# Patient Record
Sex: FEMALE | Race: ASIAN | NOT HISPANIC OR LATINO | URBAN - METROPOLITAN AREA
[De-identification: names, ages, dates, MRNs, and addresses within clinical notes are randomized per-mention and may not be internally consistent; named-entity substitution may affect disease eponyms.]

---

## 2017-01-09 ENCOUNTER — EMERGENCY (EMERGENCY)
Facility: HOSPITAL | Age: 77
LOS: 1 days | Discharge: HOME | End: 2017-01-09
Attending: EMERGENCY MEDICINE | Admitting: EMERGENCY MEDICINE
Payer: COMMERCIAL

## 2017-01-09 ENCOUNTER — APPOINTMENT (OUTPATIENT)
Dept: UROLOGY | Facility: CLINIC | Age: 77
End: 2017-01-09

## 2017-01-09 VITALS
HEART RATE: 65 BPM | RESPIRATION RATE: 16 BRPM | SYSTOLIC BLOOD PRESSURE: 116 MMHG | DIASTOLIC BLOOD PRESSURE: 65 MMHG | OXYGEN SATURATION: 98 %

## 2017-01-09 VITALS — RESPIRATION RATE: 16 BRPM | HEART RATE: 78 BPM | DIASTOLIC BLOOD PRESSURE: 60 MMHG | SYSTOLIC BLOOD PRESSURE: 108 MMHG
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
## 2017-01-09 DIAGNOSIS — Z87.448 PERSONAL HISTORY OF OTHER DISEASES OF URINARY SYSTEM: Chronic | ICD-10-CM
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
## 2017-01-09 DIAGNOSIS — Z98.890 OTHER SPECIFIED POSTPROCEDURAL STATES: Chronic | ICD-10-CM

## 2017-01-09 DIAGNOSIS — T83.092A OTHER MECHANICAL COMPLICATION OF NEPHROSTOMY CATHETER, INITIAL ENCOUNTER: ICD-10-CM

## 2017-01-09 DIAGNOSIS — Z93.3 COLOSTOMY STATUS: Chronic | ICD-10-CM

## 2017-01-09 DIAGNOSIS — Z88.0 ALLERGY STATUS TO PENICILLIN: ICD-10-CM

## 2017-01-09 PROCEDURE — 50431 NJX PX NFROSGRM &/URTRGRM: CPT

## 2017-01-09 PROCEDURE — C1729: CPT

## 2017-01-09 PROCEDURE — 50431 NJX PX NFROSGRM &/URTRGRM: CPT | Mod: LT

## 2017-01-09 PROCEDURE — C1769: CPT

## 2017-01-09 PROCEDURE — 74018 RADEX ABDOMEN 1 VIEW: CPT

## 2017-01-09 PROCEDURE — 99284 EMERGENCY DEPT VISIT MOD MDM: CPT

## 2017-01-09 PROCEDURE — 74000: CPT | Mod: 26

## 2017-01-09 PROCEDURE — 99284 EMERGENCY DEPT VISIT MOD MDM: CPT | Mod: 25

## 2017-01-09 PROCEDURE — 99285 EMERGENCY DEPT VISIT HI MDM: CPT | Mod: GC

## 2017-01-09 NOTE — ED PROVIDER NOTE - MEDICAL DECISION MAKING DETAILS
75 yo female with stage 4 colon ca presenting with leaking urostomy site; will consult urology for change

## 2017-01-09 NOTE — ED PROVIDER NOTE - ATTENDING CONTRIBUTION TO CARE
75 y/o f wit pmhx stage 4 metastatic colon ca on home hospice with bilateral urostomy bags and colostomy bag presents for leaking from Urostomy site on the left. Daughter noted it and concern of increasing urine. Had urostomy placed approx 1 year ago at [facility].   No bleeding no pain. no fever no chills. patient also has picc line for dilaudid.  nephrostomy tube was changed 3 weeks ago during last admission   Gen. elderly woman, no apparent distress, appears stated age.  HEENT: EOMI, moist mm.  Lungs: b/l air entry   CVS: S1S2   Abd; Soft non tender, non distended   back nephrostomy tube in back, left side lower and displaced inferior that right side. back appears clean dry and intact, no erythema.    Ext: no edema   Neuro Awake alert, appropriate.

## 2017-01-09 NOTE — ED ADULT NURSE REASSESSMENT NOTE - NS ED NURSE REASSESS COMMENT FT1
pt left with daughter via wheelchair prior to signing d/c paperwork, pt was waiting for non-emergent ambulette and daughter did not wish to wait much longer and preferred to take her mother home her self via private car. pt ambulates well without assistance. upon returning to pt with d/c paperwork daughter and pt had both left, pt had no IV in place. rep in gold notified and ambulance for transfer canceled successfully.

## 2017-01-09 NOTE — ED ADULT NURSE NOTE - OBJECTIVE STATEMENT
76 y.o female arrived by ambulance c c/o nephrostomy tubes not draining. Approx. 25 cc or urine present in B/L nephrotomy tubes upon exam/ light yellow urine. Pt has colostomy bag in place. PCA pump- hydromorphone POA infusing into PICC L arm. Daughter on way to hospital as per EMS. pt pointing to L lower back as site of discomfort- no rash noted to the area. Afebrile. No visible chills. Pitting edema to Lower extremities below knee. No active vomiting.

## 2017-01-09 NOTE — ED PROVIDER NOTE - CARE PLAN
Principal Discharge DX:	Nephrostomy complication  Instructions for follow-up, activity and diet:	Follow up with your medical doctor in 2-3 days or call our clinic at [phone] and state you were seen in the Emergency Department and would like to be seen in clinic.   Drink at least 2 Liters or 64 Ounces of water each day.  Return for any persistent, worsening symptoms, or ANY concerns at all. Principal Discharge DX:	Nephrostomy complication  Instructions for follow-up, activity and diet:	Follow up with your medical doctor in 2-3 days or call our clinic at [phone] and state you were seen in the Emergency Department and would like to be seen in clinic.   Drink at least 2 Liters or 64 Ounces of water each day.  Return for any persistent, worsening symptoms, or ANY concerns at all.

## 2017-01-09 NOTE — ED PROVIDER NOTE - PROGRESS NOTE DETAILS
spoke with Mildred Cunha from hospice who states that they are not responsible for changing the nephrostomy tubes, they help clean and change the dressing.  she states that there was increased leakage from the left nephrostomy tube.  no other issues relative to her baseline. cleared by IR, no intervention was needed.  patient was ready for discharge

## 2017-01-09 NOTE — ED ADULT NURSE NOTE - PMH
Colon cancer  metastatic to abdominal lymph nodes, uterus, and possibly lungs  Urinary obstruction  by abdominal mass s/p b/l nephrostomy tubes.

## 2017-01-09 NOTE — ED PROVIDER NOTE - OBJECTIVE STATEMENT
75 yo female with stage 4 metastatic colon ca on home hospice with bilateral urostomy bags and colostomy bag presenting with leaking urostomy site on the left side.  per daughter, patient has been noticing increased urine and wetness of her clothes for the last few days.  urostomy bags were placed 1 year ago at [facility].  patients daughter does not remember the name of the physician who performed the urostomy.  denies pain, fevers, chills, n/v/d.  otherwise patient is her baseline.  has picc line for dilaudid.    PCP- does not have one 77 yo female with stage 4 metastatic colon ca on home hospice with bilateral urostomy bags and colostomy bag presenting with leaking urostomy site on the left side.  per daughter, patient has been noticing increased urine and wetness of her clothes for the last few days.  urostomy bags were placed 1 year ago at [facility].  patients daughter does not remember the name of the physician who performed the urostomy.  denies pain, fevers, chills, n/v/d.  otherwise patient is her baseline.  has picc line for dilaudid.  nephrostomy tube was changed 3 weeks ago during last admission     PCP- does not have one

## 2017-01-09 NOTE — ED PROVIDER NOTE - PLAN OF CARE
Follow up with your medical doctor in 2-3 days or call our clinic at [phone] and state you were seen in the Emergency Department and would like to be seen in clinic.   Drink at least 2 Liters or 64 Ounces of water each day.  Return for any persistent, worsening symptoms, or ANY concerns at all.

## 2017-01-19 ENCOUNTER — APPOINTMENT (OUTPATIENT)
Dept: UROLOGY | Facility: CLINIC | Age: 77
End: 2017-01-19

## 2017-01-19 VITALS
OXYGEN SATURATION: 97 % | SYSTOLIC BLOOD PRESSURE: 117 MMHG | BODY MASS INDEX: 21.62 KG/M2 | HEART RATE: 74 BPM | RESPIRATION RATE: 18 BRPM | DIASTOLIC BLOOD PRESSURE: 72 MMHG | HEIGHT: 63 IN | WEIGHT: 122 LBS

## 2017-01-19 DIAGNOSIS — N13.5 CROSSING VESSEL AND STRICTURE OF URETER W/OUT HYDRONEPHROSIS: ICD-10-CM

## 2017-01-19 DIAGNOSIS — R35.0 FREQUENCY OF MICTURITION: ICD-10-CM

## 2017-01-19 DIAGNOSIS — C18.9 MALIGNANT NEOPLASM OF COLON, UNSPECIFIED: ICD-10-CM

## 2017-01-19 DIAGNOSIS — R30.0 DYSURIA: ICD-10-CM

## 2017-01-19 DIAGNOSIS — Z78.9 OTHER SPECIFIED HEALTH STATUS: ICD-10-CM

## 2017-01-19 RX ORDER — LORATADINE 10 MG
17 TABLET,DISINTEGRATING ORAL
Refills: 0 | Status: ACTIVE | COMMUNITY

## 2017-01-19 RX ORDER — DEXAMETHASONE 6 MG/1
6 TABLET ORAL
Refills: 0 | Status: ACTIVE | COMMUNITY

## 2017-01-19 RX ORDER — DOCUSATE SODIUM 100 MG/1
TABLET ORAL
Refills: 0 | Status: ACTIVE | COMMUNITY

## 2017-01-19 RX ORDER — HYDROMORPHONE HYDROCHLORIDE 2 MG/ML
2 INJECTION INTRAMUSCULAR; INTRAVENOUS; SUBCUTANEOUS
Refills: 0 | Status: ACTIVE | COMMUNITY

## 2017-02-18 ENCOUNTER — INPATIENT (INPATIENT)
Facility: HOSPITAL | Age: 77
LOS: 4 days | Discharge: ANOTHER HOSPITAL | DRG: 389 | End: 2017-02-23
Attending: INTERNAL MEDICINE | Admitting: INTERNAL MEDICINE
Payer: OTHER MISCELLANEOUS

## 2017-02-18 VITALS
HEART RATE: 94 BPM | TEMPERATURE: 101 F | DIASTOLIC BLOOD PRESSURE: 88 MMHG | RESPIRATION RATE: 22 BRPM | SYSTOLIC BLOOD PRESSURE: 134 MMHG | OXYGEN SATURATION: 88 %

## 2017-02-18 DIAGNOSIS — Z93.3 COLOSTOMY STATUS: Chronic | ICD-10-CM

## 2017-02-18 DIAGNOSIS — K56.69 OTHER INTESTINAL OBSTRUCTION: ICD-10-CM

## 2017-02-18 DIAGNOSIS — R50.9 FEVER, UNSPECIFIED: ICD-10-CM

## 2017-02-18 DIAGNOSIS — Z41.8 ENCOUNTER FOR OTHER PROCEDURES FOR PURPOSES OTHER THAN REMEDYING HEALTH STATE: ICD-10-CM

## 2017-02-18 DIAGNOSIS — Z98.890 OTHER SPECIFIED POSTPROCEDURAL STATES: Chronic | ICD-10-CM

## 2017-02-18 DIAGNOSIS — C18.9 MALIGNANT NEOPLASM OF COLON, UNSPECIFIED: ICD-10-CM

## 2017-02-18 DIAGNOSIS — N13.9 OBSTRUCTIVE AND REFLUX UROPATHY, UNSPECIFIED: ICD-10-CM

## 2017-02-18 DIAGNOSIS — Z87.448 PERSONAL HISTORY OF OTHER DISEASES OF URINARY SYSTEM: Chronic | ICD-10-CM

## 2017-02-18 DIAGNOSIS — R60.0 LOCALIZED EDEMA: ICD-10-CM

## 2017-02-18 LAB
ALBUMIN SERPL ELPH-MCNC: 3.2 G/DL — LOW (ref 3.3–5)
ALP SERPL-CCNC: 52 U/L — SIGNIFICANT CHANGE UP (ref 40–120)
ALT FLD-CCNC: 11 U/L RC — SIGNIFICANT CHANGE UP (ref 10–45)
ANION GAP SERPL CALC-SCNC: 13 MMOL/L — SIGNIFICANT CHANGE UP (ref 5–17)
APTT BLD: 28.3 SEC — SIGNIFICANT CHANGE UP (ref 27.5–37.4)
AST SERPL-CCNC: 11 U/L — SIGNIFICANT CHANGE UP (ref 10–40)
BASOPHILS # BLD AUTO: 0 K/UL — SIGNIFICANT CHANGE UP (ref 0–0.2)
BASOPHILS NFR BLD AUTO: 0.1 % — SIGNIFICANT CHANGE UP (ref 0–2)
BILIRUB SERPL-MCNC: 0.5 MG/DL — SIGNIFICANT CHANGE UP (ref 0.2–1.2)
BUN SERPL-MCNC: 33 MG/DL — HIGH (ref 7–23)
CALCIUM SERPL-MCNC: 8.4 MG/DL — SIGNIFICANT CHANGE UP (ref 8.4–10.5)
CHLORIDE SERPL-SCNC: 96 MMOL/L — SIGNIFICANT CHANGE UP (ref 96–108)
CO2 SERPL-SCNC: 25 MMOL/L — SIGNIFICANT CHANGE UP (ref 22–31)
CREAT SERPL-MCNC: 1.06 MG/DL — SIGNIFICANT CHANGE UP (ref 0.5–1.3)
EOSINOPHIL # BLD AUTO: 0 K/UL — SIGNIFICANT CHANGE UP (ref 0–0.5)
EOSINOPHIL NFR BLD AUTO: 0.6 % — SIGNIFICANT CHANGE UP (ref 0–6)
GAS PNL BLDV: SIGNIFICANT CHANGE UP
GLUCOSE SERPL-MCNC: 133 MG/DL — HIGH (ref 70–99)
HCT VFR BLD CALC: 34.5 % — SIGNIFICANT CHANGE UP (ref 34.5–45)
HGB BLD-MCNC: 11.3 G/DL — LOW (ref 11.5–15.5)
INR BLD: 1.02 RATIO — SIGNIFICANT CHANGE UP (ref 0.88–1.16)
LYMPHOCYTES # BLD AUTO: 0.4 K/UL — LOW (ref 1–3.3)
LYMPHOCYTES # BLD AUTO: 7.8 % — LOW (ref 13–44)
MAGNESIUM SERPL-MCNC: 2.6 MG/DL — SIGNIFICANT CHANGE UP (ref 1.6–2.6)
MCHC RBC-ENTMCNC: 29.4 PG — SIGNIFICANT CHANGE UP (ref 27–34)
MCHC RBC-ENTMCNC: 32.6 GM/DL — SIGNIFICANT CHANGE UP (ref 32–36)
MCV RBC AUTO: 90.1 FL — SIGNIFICANT CHANGE UP (ref 80–100)
MONOCYTES # BLD AUTO: 0.4 K/UL — SIGNIFICANT CHANGE UP (ref 0–0.9)
MONOCYTES NFR BLD AUTO: 9.5 % — SIGNIFICANT CHANGE UP (ref 2–14)
NEUTROPHILS # BLD AUTO: 3.9 K/UL — SIGNIFICANT CHANGE UP (ref 1.8–7.4)
NEUTROPHILS NFR BLD AUTO: 82 % — HIGH (ref 43–77)
PHOSPHATE SERPL-MCNC: 4.8 MG/DL — HIGH (ref 2.5–4.5)
PLATELET # BLD AUTO: 379 K/UL — SIGNIFICANT CHANGE UP (ref 150–400)
POTASSIUM SERPL-MCNC: 4.7 MMOL/L — SIGNIFICANT CHANGE UP (ref 3.5–5.3)
POTASSIUM SERPL-SCNC: 4.7 MMOL/L — SIGNIFICANT CHANGE UP (ref 3.5–5.3)
PROT SERPL-MCNC: 6 G/DL — SIGNIFICANT CHANGE UP (ref 6–8.3)
PROTHROM AB SERPL-ACNC: 11.1 SEC — SIGNIFICANT CHANGE UP (ref 10–13.1)
RBC # BLD: 3.83 M/UL — SIGNIFICANT CHANGE UP (ref 3.8–5.2)
RBC # FLD: 16.3 % — HIGH (ref 10.3–14.5)
SODIUM SERPL-SCNC: 134 MMOL/L — LOW (ref 135–145)
WBC # BLD: 4.7 K/UL — SIGNIFICANT CHANGE UP (ref 3.8–10.5)
WBC # FLD AUTO: 4.7 K/UL — SIGNIFICANT CHANGE UP (ref 3.8–10.5)

## 2017-02-18 PROCEDURE — 99223 1ST HOSP IP/OBS HIGH 75: CPT | Mod: GC

## 2017-02-18 PROCEDURE — 71010: CPT | Mod: 26

## 2017-02-18 PROCEDURE — 99285 EMERGENCY DEPT VISIT HI MDM: CPT

## 2017-02-18 PROCEDURE — 74177 CT ABD & PELVIS W/CONTRAST: CPT | Mod: 26

## 2017-02-18 RX ORDER — HYDROMORPHONE HYDROCHLORIDE 2 MG/ML
2 INJECTION INTRAMUSCULAR; INTRAVENOUS; SUBCUTANEOUS ONCE
Qty: 0 | Refills: 0 | Status: DISCONTINUED | OUTPATIENT
Start: 2017-02-18 | End: 2017-02-19

## 2017-02-18 RX ORDER — SODIUM CHLORIDE 9 MG/ML
1000 INJECTION INTRAMUSCULAR; INTRAVENOUS; SUBCUTANEOUS ONCE
Qty: 0 | Refills: 0 | Status: COMPLETED | OUTPATIENT
Start: 2017-02-18 | End: 2017-02-18

## 2017-02-18 RX ORDER — HALOPERIDOL DECANOATE 100 MG/ML
0.5 INJECTION INTRAMUSCULAR EVERY 6 HOURS
Qty: 0 | Refills: 0 | Status: DISCONTINUED | OUTPATIENT
Start: 2017-02-18 | End: 2017-02-23

## 2017-02-18 RX ORDER — SODIUM CHLORIDE 9 MG/ML
1000 INJECTION INTRAMUSCULAR; INTRAVENOUS; SUBCUTANEOUS
Qty: 0 | Refills: 0 | Status: DISCONTINUED | OUTPATIENT
Start: 2017-02-18 | End: 2017-02-23

## 2017-02-18 RX ORDER — DEXAMETHASONE 0.5 MG/5ML
6 ELIXIR ORAL EVERY 8 HOURS
Qty: 0 | Refills: 0 | Status: DISCONTINUED | OUTPATIENT
Start: 2017-02-18 | End: 2017-02-23

## 2017-02-18 RX ORDER — ONDANSETRON 8 MG/1
4 TABLET, FILM COATED ORAL EVERY 8 HOURS
Qty: 0 | Refills: 0 | Status: DISCONTINUED | OUTPATIENT
Start: 2017-02-18 | End: 2017-02-23

## 2017-02-18 RX ORDER — ACETAMINOPHEN 500 MG
1000 TABLET ORAL ONCE
Qty: 0 | Refills: 0 | Status: COMPLETED | OUTPATIENT
Start: 2017-02-18 | End: 2017-02-18

## 2017-02-18 RX ORDER — OCTREOTIDE ACETATE 200 UG/ML
10 INJECTION, SOLUTION INTRAVENOUS; SUBCUTANEOUS
Qty: 500 | Refills: 0 | Status: DISCONTINUED | OUTPATIENT
Start: 2017-02-18 | End: 2017-02-23

## 2017-02-18 RX ORDER — DEXAMETHASONE 0.5 MG/5ML
6 ELIXIR ORAL EVERY 8 HOURS
Qty: 0 | Refills: 0 | Status: DISCONTINUED | OUTPATIENT
Start: 2017-02-18 | End: 2017-02-18

## 2017-02-18 RX ORDER — HYDROMORPHONE HYDROCHLORIDE 2 MG/ML
30 INJECTION INTRAMUSCULAR; INTRAVENOUS; SUBCUTANEOUS
Qty: 0 | Refills: 0 | Status: DISCONTINUED | OUTPATIENT
Start: 2017-02-18 | End: 2017-02-19

## 2017-02-18 RX ORDER — HEPARIN SODIUM 5000 [USP'U]/ML
5000 INJECTION INTRAVENOUS; SUBCUTANEOUS EVERY 8 HOURS
Qty: 0 | Refills: 0 | Status: DISCONTINUED | OUTPATIENT
Start: 2017-02-18 | End: 2017-02-18

## 2017-02-18 RX ADMIN — ONDANSETRON 4 MILLIGRAM(S): 8 TABLET, FILM COATED ORAL at 21:35

## 2017-02-18 RX ADMIN — OCTREOTIDE ACETATE 2 MICROGRAM(S)/HR: 200 INJECTION, SOLUTION INTRAVENOUS; SUBCUTANEOUS at 21:30

## 2017-02-18 RX ADMIN — HYDROMORPHONE HYDROCHLORIDE 30 MILLILITER(S): 2 INJECTION INTRAMUSCULAR; INTRAVENOUS; SUBCUTANEOUS at 22:22

## 2017-02-18 RX ADMIN — SODIUM CHLORIDE 20 MILLILITER(S): 9 INJECTION INTRAMUSCULAR; INTRAVENOUS; SUBCUTANEOUS at 21:30

## 2017-02-18 RX ADMIN — Medication 400 MILLIGRAM(S): at 15:31

## 2017-02-18 RX ADMIN — HEPARIN SODIUM 5000 UNIT(S): 5000 INJECTION INTRAVENOUS; SUBCUTANEOUS at 21:34

## 2017-02-18 RX ADMIN — SODIUM CHLORIDE 2000 MILLILITER(S): 9 INJECTION INTRAMUSCULAR; INTRAVENOUS; SUBCUTANEOUS at 15:31

## 2017-02-18 RX ADMIN — Medication 6 MILLIGRAM(S): at 21:34

## 2017-02-18 NOTE — H&P ADULT. - RS GEN PE MLT RESP DETAILS PC
respirations non-labored/normal/breath sounds equal breath sounds equal/normal/clear to auscultation bilaterally/respirations non-labored

## 2017-02-18 NOTE — H&P ADULT. - ASSESSMENT
76F Mandarin-speaking PMH Stage 4 colon Ca with mets to LN, uterus, and possibly lung s/p partial colectomy with Carmine procedure (done in [place] in 2014), c/b enterocolic fistula, b/l nephrostomy tubes (done in 2015), on home hospice ([facility]) p/w abdominal pain and decreased output into colostomy bag x 2 days 2/2 SBO

## 2017-02-18 NOTE — ED PROVIDER NOTE - ABDOMINAL EXAM
tender.../L sides ostomy w/ no air distension - right lower quadrant pain and a large firm mass palpated - signifcant abdominal distension - b/l nephrostomy tubes, w/ minimal drainage from R tube - L nephrostomy tube w/ foul smelling urine/FIRM/DISTENDED

## 2017-02-18 NOTE — H&P ADULT. - CONSTITUTIONAL DETAILS
well-groomed/no distress/thin, tired appearing/well-developed well-groomed/distress due to pain/thin, tired appearing/well-developed

## 2017-02-18 NOTE — H&P ADULT. - GASTROINTESTINAL COMMENTS
per daughter, perhaps mild decrease in output from colostomy Minimal amount of somewhat formed, brown stool in colostomy bag

## 2017-02-18 NOTE — H&P ADULT. - GIT GEN HX ROS MEA POS PC
vomiting/change in bowel habits/nausea/abdominal pain constipation/change in bowel habits/abdominal pain

## 2017-02-18 NOTE — H&P ADULT. - NEUROLOGICAL DETAILS
responds to verbal commands/alert and oriented x 3 sensation intact/responds to verbal commands/responds to pain/alert and oriented x 3

## 2017-02-18 NOTE — H&P ADULT. - HISTORY OF PRESENT ILLNESS
76F Mandarin-speaking PMH Stage 4 colon Ca with mets to LN, uterus, and possibly lung s/p partial colectomy with Carmine procedure (done in [place] in 2014), c/b enterocolic fistula, b/l nephrostomy tubes (done in 2015), on home hospice ([facility])p/w abdominal pain and decreased output into colostomy bag x 2 days. History is provided by patient via [place]  [id number] and her daughter at bedside who assisted. The patient was recently admitted in December for a new SBO and ileus that improved with conservative mgmt with NGT and pain control and was discharged back on home hospice.  Patient comes in now complaining of abdominal pain and decreased stool output since yesterday morning.  Patient and daughter state that they noticed yesterday morning that the patient hardly had any stool going into the ostomy bag.  The stool that was coming out was brown with no blood.  Around that time, the patient also started experiencing abdominal pain that she describes as diffuse, 10/10 in intensity, and crampy.  She has had no episodes of nausea and vomiting but has had decreased PO intake.  The patient is on a home PCA pump and has been using the pump every 15 minutes with minimal relief.  Today, the patient was able to take her Senna and Miralax but still had minimal stool output and the pain persisted so the daughter brought her to the ED.  Upon meeting the patient, she appears to be in moderate distress.  She complains of persistent abdominal pain but denies any HA, chest pain, fevers, chills, N/V, cough, or SOB.  She has had no fevers or urinary burning at home (history of frequent UTIs).    In ED, VS  T-100.9, HR 94, /88, RR 22, and O2 Sat 88% on RA. Labs significant for Hgb 11.3, Na+ of 134.  VBG 7.41/44/75 and lactate 1.6 CT A/P read as small bowel obstruction with transition point in the mid pelvis likely   due to tumor. There is a second transition point at the level of the parastomal hernia. Left parastomal hernia, which now contains loops of dilated small bowel. Moderate ascites and likely peritoneal carcinomatosis.Excessive tumor infiltration involving the uterus and posteriorly into Lewis's pouch. Interval increase in size of bilateral necrotic pelvic lymph nodes. Numerous pulmonary metastases new from prior exam.  Pt received 1 g IV tylenol with minimal pain relief.  NGT placed with minimal clear-yellow gastric fluid output. 75 y/o F Mandarin-speaking PMH Stage 4 colon Ca with mets to LN, uterus, and possibly lung s/p partial colectomy with Carmine procedure (done in [place] in 2014), c/b enterocolic fistula, b/l nephrostomy tubes (done in 2015), on home hospice ([facility])p/w abdominal pain and decreased output into colostomy bag x 2 days. History is provided by patient via [place]  [id number] and her daughter at bedside who assisted. The patient was recently admitted in December for a new SBO and ileus that improved with conservative mgmt with NGT and pain control and was discharged back on home hospice.  Patient comes in now complaining of abdominal pain and decreased stool output since yesterday morning.  Patient and daughter state that they noticed yesterday morning that the patient hardly had any stool going into the ostomy bag.  The stool that was coming out was brown with no blood.  Around that time, the patient also started experiencing abdominal pain that she describes as diffuse, 10/10 in intensity, and crampy.  She has had no episodes of nausea and vomiting but has had decreased PO intake.  The patient is on a home PCA pump and has been using the pump every 15 minutes with minimal relief.  Today, the patient was able to take her Senna and Miralax but still had minimal stool output and the pain persisted so the daughter brought her to the ED.  Upon meeting the patient, she appears to be in moderate distress.  She complains of persistent abdominal pain but denies any HA, chest pain, fevers, chills, N/V, cough, or SOB.  She has had no fevers or urinary burning at home (history of frequent UTIs).    In ED, VS  T-100.9, HR 94, /88, RR 22, and O2 Sat 88% on RA. Labs significant for Hgb 11.3, Na+ of 134.  VBG 7.41/44/75 and lactate 1.6 CT A/P read as small bowel obstruction with transition point in the mid pelvis likely   due to tumor. There is a second transition point at the level of the parastomal hernia. Left parastomal hernia, which now contains loops of dilated small bowel. Moderate ascites and likely peritoneal carcinomatosis.Excessive tumor infiltration involving the uterus and posteriorly into Lewis's pouch. Interval increase in size of bilateral necrotic pelvic lymph nodes. Numerous pulmonary metastases new from prior exam.  Pt received 1 g IV tylenol with minimal pain relief.  NGT placed with minimal clear-yellow gastric fluid output.

## 2017-02-18 NOTE — H&P ADULT. - PROBLEM SELECTOR PLAN 3
-Nephrostomy tubes both draining well and in place on CT scan; while CT shows mild hydronephrosis kidney function is at baseline.  Will continue to monitor nephrostomy tube output

## 2017-02-18 NOTE — ED PROVIDER NOTE - MEDICAL DECISION MAKING DETAILS
Debbie resident: 75 y/o with stage 4 colon Ca on home hospice p/w abdominal pain, distension, and decreased ostomy output - likely a SBO - daughter wants us to obtain imaging and treat, including inserting an NG tube if necessary - will obtain labs, CT, and treat accordingly

## 2017-02-18 NOTE — ED PROVIDER NOTE - OBJECTIVE STATEMENT
75 yo female with stage 4 metastatic colon ca on home hospice with bilateral urostomy bags and colostomy bag presenting with abdominal pain. 75 yo female with stage 4 metastatic colon ca on home hospice with bilateral urostomy bags and colostomy bag presenting with abdominal pain. Patient is Mandarian speaking, and daughter requested  service. Per daughter, is on home hospice and for past 2 days, decreased gas and stool into ostomy bag. Also reports fever and decreased PO intake. Patient has a dilaudid pump and has been using the pump 4x an hour 2/2 to severe abdominal pain. Patient also has b/l nephrostomy tubes, w/ good output. Denies CP, SOB, N/V.

## 2017-02-18 NOTE — ED PROVIDER NOTE - ATTENDING CONTRIBUTION TO CARE
76yoF with terminal colon ca on [facility] hospice brought by daughter for increased abd pain and no ostomy ouput.     On exam, pt with abd ttp and distention.     A: abd pain likely SBO in hospice pt  P: will give pain meds, labs, CT to eval for possible palliative options, dc to inpt hospice.

## 2017-02-18 NOTE — H&P ADULT. - PROBLEM SELECTOR PLAN 1
-CT findings consistent with dilated loops of bowel unchanged from Dec 6 CT but also now with redemonstration of a left parastomal hernia, which now contains loops of dilated small bowel and fluid.  Also, there is a second transition zone of small bowel dilatation at the hernia best seen on image #61 of series 3.  Explained to daughter and patient the poor prognosis in the setting of these multiple new SBOs and the recommendation of conservative treatment with NGT, Octreotide gtt, and pain control as pt is likely not a surgical candidate.  Pt and daughter agree to conservative treatment for now; NGT placed with minimal gastric contents coming out; will leave on intermittent suction overnight  -Pt admitted to PCU and spoke with fellow who put in orders for PCA pump along with breakthrough pain meds  -Will monitor output into ostomy bag  -Will hold off on surgery c/s for now  -Pt may take PO as tolerated  -Zofran PRN  -Gentle hydration with NS @ 20 cc/hr  -C/w Decadron 6 mg IV q8 hrs

## 2017-02-18 NOTE — ED PROVIDER NOTE - PROGRESS NOTE DETAILS
Discussed with daughter via  phone that patient like has a bowel obstruction, but is not a surgical candiate - we understand that patient is on home hospice, and asked daughter if she would like everything done to try to alleviate abdominal pathology - daughter states she would like us to treat her, including inserting a NG tube if necessary - patient's daughter does state patient is DNR and DNI however - will obtain CT and dispo accordingly

## 2017-02-18 NOTE — ED ADULT NURSE NOTE - OBJECTIVE STATEMENT
pt presents to 36 from home hospice. alert, moaning in pain. Speaks Mandarin. Daughter states pt has had decreased ostomy output since last night. Hx of colon ca w mets to pelvis. Pt is noted to have an colostomy with some residual stool, bilateral nephrostomies (left side greater output than right side), and a PICC on the right arm infusing PCA Dilaudid. SpO2 on RA was 88%, placed on NC at 2 lpm with SpO2 increase to 98%. oral temp 99.8, rectal temp 100.9. other vitals as noted. awaiting orders.

## 2017-02-18 NOTE — H&P ADULT. - PROBLEM SELECTOR PLAN 2
-Pt with advanced stage IV colon cancer, not on any chemo therapy.  Daughter states pt hasn't received chemo since 2015 as she stopped responding to it.  Daughter and patient understand poor prognosis and would like for patient to be DNR/DNI.  However, they still would like to talk about options going forward  -CT A/P, in addition to showing new SBOs, also shows peritoneal carcinomatosis, new pulmonary mets, new hepatic mets, ascites, necrotic lymph nodes, and mild left hydronephrosis despite nephrostomy tube being in place  -Pt is in PCU on hospice care; will discuss further options with patient and daughter tmrw

## 2017-02-18 NOTE — H&P ADULT. - NEGATIVE GASTROINTESTINAL SYMPTOMS
no melena/no hematochezia/no constipation/no diarrhea no diarrhea/no vomiting/no nausea/no hematochezia/no melena

## 2017-02-18 NOTE — H&P ADULT. - PROBLEM SELECTOR PLAN 4
-BLLE edema in the setting of malignancy likely 2/2 albumin deficiency but concerning also for DVT  -Will check BLLE duplex; will not A/C at this time as pt is saturating well on NC and not complaining of any chest pain, SOB, or other signs of PE

## 2017-02-18 NOTE — H&P ADULT. - PROBLEM SELECTOR PLAN 5
-Pt had rectal temp of 100.9 in the ED but has no other signs of sepsis; repeat oral temp wnl.  Will continue to monitor but will hold off on abx at this time

## 2017-02-19 PROCEDURE — 99233 SBSQ HOSP IP/OBS HIGH 50: CPT | Mod: GC

## 2017-02-19 RX ORDER — HALOPERIDOL DECANOATE 100 MG/ML
0.5 INJECTION INTRAMUSCULAR
Qty: 0 | Refills: 0 | Status: DISCONTINUED | OUTPATIENT
Start: 2017-02-19 | End: 2017-02-23

## 2017-02-19 RX ORDER — HYDROMORPHONE HYDROCHLORIDE 2 MG/ML
2 INJECTION INTRAMUSCULAR; INTRAVENOUS; SUBCUTANEOUS EVERY 4 HOURS
Qty: 0 | Refills: 0 | Status: DISCONTINUED | OUTPATIENT
Start: 2017-02-19 | End: 2017-02-23

## 2017-02-19 RX ORDER — IPRATROPIUM/ALBUTEROL SULFATE 18-103MCG
3 AEROSOL WITH ADAPTER (GRAM) INHALATION ONCE
Qty: 0 | Refills: 0 | Status: COMPLETED | OUTPATIENT
Start: 2017-02-19 | End: 2017-02-19

## 2017-02-19 RX ORDER — HYDROMORPHONE HYDROCHLORIDE 2 MG/ML
30 INJECTION INTRAMUSCULAR; INTRAVENOUS; SUBCUTANEOUS
Qty: 0 | Refills: 0 | Status: DISCONTINUED | OUTPATIENT
Start: 2017-02-19 | End: 2017-02-20

## 2017-02-19 RX ORDER — IPRATROPIUM/ALBUTEROL SULFATE 18-103MCG
3 AEROSOL WITH ADAPTER (GRAM) INHALATION EVERY 4 HOURS
Qty: 0 | Refills: 0 | Status: DISCONTINUED | OUTPATIENT
Start: 2017-02-19 | End: 2017-02-23

## 2017-02-19 RX ORDER — ONDANSETRON 8 MG/1
4 TABLET, FILM COATED ORAL EVERY 6 HOURS
Qty: 0 | Refills: 0 | Status: DISCONTINUED | OUTPATIENT
Start: 2017-02-19 | End: 2017-02-20

## 2017-02-19 RX ADMIN — HALOPERIDOL DECANOATE 0.5 MILLIGRAM(S): 100 INJECTION INTRAMUSCULAR at 17:02

## 2017-02-19 RX ADMIN — OCTREOTIDE ACETATE 2 MICROGRAM(S)/HR: 200 INJECTION, SOLUTION INTRAVENOUS; SUBCUTANEOUS at 18:50

## 2017-02-19 RX ADMIN — ONDANSETRON 4 MILLIGRAM(S): 8 TABLET, FILM COATED ORAL at 13:18

## 2017-02-19 RX ADMIN — HYDROMORPHONE HYDROCHLORIDE 30 MILLILITER(S): 2 INJECTION INTRAMUSCULAR; INTRAVENOUS; SUBCUTANEOUS at 17:19

## 2017-02-19 RX ADMIN — HALOPERIDOL DECANOATE 0.5 MILLIGRAM(S): 100 INJECTION INTRAMUSCULAR at 11:47

## 2017-02-19 RX ADMIN — HYDROMORPHONE HYDROCHLORIDE 30 MILLILITER(S): 2 INJECTION INTRAMUSCULAR; INTRAVENOUS; SUBCUTANEOUS at 10:03

## 2017-02-19 RX ADMIN — Medication 3 MILLILITER(S): at 16:28

## 2017-02-19 RX ADMIN — Medication 6 MILLIGRAM(S): at 13:18

## 2017-02-19 RX ADMIN — HYDROMORPHONE HYDROCHLORIDE 30 MILLILITER(S): 2 INJECTION INTRAMUSCULAR; INTRAVENOUS; SUBCUTANEOUS at 07:10

## 2017-02-19 RX ADMIN — Medication 6 MILLIGRAM(S): at 05:55

## 2017-02-19 RX ADMIN — OCTREOTIDE ACETATE 2 MICROGRAM(S)/HR: 200 INJECTION, SOLUTION INTRAVENOUS; SUBCUTANEOUS at 17:02

## 2017-02-19 RX ADMIN — Medication 6 MILLIGRAM(S): at 21:36

## 2017-02-19 RX ADMIN — HYDROMORPHONE HYDROCHLORIDE 30 MILLILITER(S): 2 INJECTION INTRAMUSCULAR; INTRAVENOUS; SUBCUTANEOUS at 18:50

## 2017-02-19 RX ADMIN — OCTREOTIDE ACETATE 2 MICROGRAM(S)/HR: 200 INJECTION, SOLUTION INTRAVENOUS; SUBCUTANEOUS at 08:47

## 2017-02-19 RX ADMIN — HALOPERIDOL DECANOATE 0.5 MILLIGRAM(S): 100 INJECTION INTRAMUSCULAR at 05:55

## 2017-02-19 RX ADMIN — ONDANSETRON 4 MILLIGRAM(S): 8 TABLET, FILM COATED ORAL at 21:36

## 2017-02-19 RX ADMIN — ONDANSETRON 4 MILLIGRAM(S): 8 TABLET, FILM COATED ORAL at 05:55

## 2017-02-19 RX ADMIN — HYDROMORPHONE HYDROCHLORIDE 2 MILLIGRAM(S): 2 INJECTION INTRAMUSCULAR; INTRAVENOUS; SUBCUTANEOUS at 08:45

## 2017-02-19 RX ADMIN — HYDROMORPHONE HYDROCHLORIDE 2 MILLIGRAM(S): 2 INJECTION INTRAMUSCULAR; INTRAVENOUS; SUBCUTANEOUS at 17:03

## 2017-02-19 RX ADMIN — HALOPERIDOL DECANOATE 0.5 MILLIGRAM(S): 100 INJECTION INTRAMUSCULAR at 23:49

## 2017-02-19 RX ADMIN — HALOPERIDOL DECANOATE 0.5 MILLIGRAM(S): 100 INJECTION INTRAMUSCULAR at 01:30

## 2017-02-20 PROCEDURE — 99233 SBSQ HOSP IP/OBS HIGH 50: CPT | Mod: GC

## 2017-02-20 RX ORDER — FAMOTIDINE 10 MG/ML
40 INJECTION INTRAVENOUS
Qty: 0 | Refills: 0 | Status: DISCONTINUED | OUTPATIENT
Start: 2017-02-20 | End: 2017-02-20

## 2017-02-20 RX ORDER — ROBINUL 0.2 MG/ML
0.4 INJECTION INTRAMUSCULAR; INTRAVENOUS ONCE
Qty: 0 | Refills: 0 | Status: COMPLETED | OUTPATIENT
Start: 2017-02-20 | End: 2017-02-20

## 2017-02-20 RX ORDER — PANTOPRAZOLE SODIUM 20 MG/1
20 TABLET, DELAYED RELEASE ORAL EVERY 12 HOURS
Qty: 0 | Refills: 0 | Status: DISCONTINUED | OUTPATIENT
Start: 2017-02-20 | End: 2017-02-20

## 2017-02-20 RX ORDER — PANTOPRAZOLE SODIUM 20 MG/1
40 TABLET, DELAYED RELEASE ORAL
Qty: 0 | Refills: 0 | Status: DISCONTINUED | OUTPATIENT
Start: 2017-02-20 | End: 2017-02-23

## 2017-02-20 RX ORDER — HYDROMORPHONE HYDROCHLORIDE 2 MG/ML
30 INJECTION INTRAMUSCULAR; INTRAVENOUS; SUBCUTANEOUS
Qty: 0 | Refills: 0 | Status: DISCONTINUED | OUTPATIENT
Start: 2017-02-20 | End: 2017-02-23

## 2017-02-20 RX ORDER — HYDRALAZINE HCL 50 MG
10 TABLET ORAL ONCE
Qty: 0 | Refills: 0 | Status: COMPLETED | OUTPATIENT
Start: 2017-02-20 | End: 2017-02-20

## 2017-02-20 RX ADMIN — HYDROMORPHONE HYDROCHLORIDE 30 MILLILITER(S): 2 INJECTION INTRAMUSCULAR; INTRAVENOUS; SUBCUTANEOUS at 07:16

## 2017-02-20 RX ADMIN — HYDROMORPHONE HYDROCHLORIDE 30 MILLILITER(S): 2 INJECTION INTRAMUSCULAR; INTRAVENOUS; SUBCUTANEOUS at 11:53

## 2017-02-20 RX ADMIN — PANTOPRAZOLE SODIUM 40 MILLIGRAM(S): 20 TABLET, DELAYED RELEASE ORAL at 17:29

## 2017-02-20 RX ADMIN — ONDANSETRON 4 MILLIGRAM(S): 8 TABLET, FILM COATED ORAL at 06:19

## 2017-02-20 RX ADMIN — HALOPERIDOL DECANOATE 0.5 MILLIGRAM(S): 100 INJECTION INTRAMUSCULAR at 11:51

## 2017-02-20 RX ADMIN — Medication 6 MILLIGRAM(S): at 14:21

## 2017-02-20 RX ADMIN — Medication 6 MILLIGRAM(S): at 06:19

## 2017-02-20 RX ADMIN — ROBINUL 0.4 MILLIGRAM(S): 0.2 INJECTION INTRAMUSCULAR; INTRAVENOUS at 11:51

## 2017-02-20 RX ADMIN — HALOPERIDOL DECANOATE 0.5 MILLIGRAM(S): 100 INJECTION INTRAMUSCULAR at 17:29

## 2017-02-20 RX ADMIN — OCTREOTIDE ACETATE 2 MICROGRAM(S)/HR: 200 INJECTION, SOLUTION INTRAVENOUS; SUBCUTANEOUS at 07:16

## 2017-02-20 RX ADMIN — HYDROMORPHONE HYDROCHLORIDE 30 MILLILITER(S): 2 INJECTION INTRAMUSCULAR; INTRAVENOUS; SUBCUTANEOUS at 22:20

## 2017-02-20 RX ADMIN — HYDROMORPHONE HYDROCHLORIDE 2 MILLIGRAM(S): 2 INJECTION INTRAMUSCULAR; INTRAVENOUS; SUBCUTANEOUS at 08:46

## 2017-02-20 RX ADMIN — Medication 6 MILLIGRAM(S): at 22:20

## 2017-02-20 RX ADMIN — HALOPERIDOL DECANOATE 0.5 MILLIGRAM(S): 100 INJECTION INTRAMUSCULAR at 06:19

## 2017-02-20 RX ADMIN — OCTREOTIDE ACETATE 2 MICROGRAM(S)/HR: 200 INJECTION, SOLUTION INTRAVENOUS; SUBCUTANEOUS at 18:29

## 2017-02-20 RX ADMIN — ONDANSETRON 4 MILLIGRAM(S): 8 TABLET, FILM COATED ORAL at 14:21

## 2017-02-20 RX ADMIN — HYDROMORPHONE HYDROCHLORIDE 30 MILLILITER(S): 2 INJECTION INTRAMUSCULAR; INTRAVENOUS; SUBCUTANEOUS at 03:14

## 2017-02-20 RX ADMIN — HYDROMORPHONE HYDROCHLORIDE 30 MILLILITER(S): 2 INJECTION INTRAMUSCULAR; INTRAVENOUS; SUBCUTANEOUS at 19:07

## 2017-02-20 RX ADMIN — Medication 10 MILLIGRAM(S): at 08:50

## 2017-02-20 RX ADMIN — ONDANSETRON 4 MILLIGRAM(S): 8 TABLET, FILM COATED ORAL at 22:19

## 2017-02-20 RX ADMIN — SODIUM CHLORIDE 20 MILLILITER(S): 9 INJECTION INTRAMUSCULAR; INTRAVENOUS; SUBCUTANEOUS at 07:16

## 2017-02-21 PROCEDURE — 99233 SBSQ HOSP IP/OBS HIGH 50: CPT | Mod: GC

## 2017-02-21 RX ADMIN — ONDANSETRON 4 MILLIGRAM(S): 8 TABLET, FILM COATED ORAL at 15:09

## 2017-02-21 RX ADMIN — SODIUM CHLORIDE 20 MILLILITER(S): 9 INJECTION INTRAMUSCULAR; INTRAVENOUS; SUBCUTANEOUS at 05:59

## 2017-02-21 RX ADMIN — HALOPERIDOL DECANOATE 0.5 MILLIGRAM(S): 100 INJECTION INTRAMUSCULAR at 12:30

## 2017-02-21 RX ADMIN — ONDANSETRON 4 MILLIGRAM(S): 8 TABLET, FILM COATED ORAL at 22:29

## 2017-02-21 RX ADMIN — OCTREOTIDE ACETATE 2 MICROGRAM(S)/HR: 200 INJECTION, SOLUTION INTRAVENOUS; SUBCUTANEOUS at 17:31

## 2017-02-21 RX ADMIN — ONDANSETRON 4 MILLIGRAM(S): 8 TABLET, FILM COATED ORAL at 05:58

## 2017-02-21 RX ADMIN — Medication 6 MILLIGRAM(S): at 22:29

## 2017-02-21 RX ADMIN — HALOPERIDOL DECANOATE 0.5 MILLIGRAM(S): 100 INJECTION INTRAMUSCULAR at 05:59

## 2017-02-21 RX ADMIN — HYDROMORPHONE HYDROCHLORIDE 30 MILLILITER(S): 2 INJECTION INTRAMUSCULAR; INTRAVENOUS; SUBCUTANEOUS at 19:08

## 2017-02-21 RX ADMIN — HALOPERIDOL DECANOATE 0.5 MILLIGRAM(S): 100 INJECTION INTRAMUSCULAR at 23:04

## 2017-02-21 RX ADMIN — HALOPERIDOL DECANOATE 0.5 MILLIGRAM(S): 100 INJECTION INTRAMUSCULAR at 17:28

## 2017-02-21 RX ADMIN — PANTOPRAZOLE SODIUM 40 MILLIGRAM(S): 20 TABLET, DELAYED RELEASE ORAL at 05:59

## 2017-02-21 RX ADMIN — PANTOPRAZOLE SODIUM 40 MILLIGRAM(S): 20 TABLET, DELAYED RELEASE ORAL at 17:28

## 2017-02-21 RX ADMIN — HALOPERIDOL DECANOATE 0.5 MILLIGRAM(S): 100 INJECTION INTRAMUSCULAR at 00:06

## 2017-02-21 RX ADMIN — Medication 6 MILLIGRAM(S): at 05:58

## 2017-02-21 RX ADMIN — HYDROMORPHONE HYDROCHLORIDE 30 MILLILITER(S): 2 INJECTION INTRAMUSCULAR; INTRAVENOUS; SUBCUTANEOUS at 07:05

## 2017-02-21 RX ADMIN — Medication 6 MILLIGRAM(S): at 15:09

## 2017-02-21 RX ADMIN — HYDROMORPHONE HYDROCHLORIDE 30 MILLILITER(S): 2 INJECTION INTRAMUSCULAR; INTRAVENOUS; SUBCUTANEOUS at 16:21

## 2017-02-21 NOTE — DIETITIAN INITIAL EVALUATION ADULT. - ENERGY NEEDS
As per Palliative Care fellow, pt currently c obstruction, nutrition assessment inappropriate at this time. Team aware RD remains available.

## 2017-02-22 PROCEDURE — 99233 SBSQ HOSP IP/OBS HIGH 50: CPT | Mod: GC

## 2017-02-22 RX ADMIN — ONDANSETRON 4 MILLIGRAM(S): 8 TABLET, FILM COATED ORAL at 13:41

## 2017-02-22 RX ADMIN — Medication 6 MILLIGRAM(S): at 13:41

## 2017-02-22 RX ADMIN — HYDROMORPHONE HYDROCHLORIDE 30 MILLILITER(S): 2 INJECTION INTRAMUSCULAR; INTRAVENOUS; SUBCUTANEOUS at 19:08

## 2017-02-22 RX ADMIN — OCTREOTIDE ACETATE 2 MICROGRAM(S)/HR: 200 INJECTION, SOLUTION INTRAVENOUS; SUBCUTANEOUS at 19:08

## 2017-02-22 RX ADMIN — HALOPERIDOL DECANOATE 0.5 MILLIGRAM(S): 100 INJECTION INTRAMUSCULAR at 13:40

## 2017-02-22 RX ADMIN — HYDROMORPHONE HYDROCHLORIDE 30 MILLILITER(S): 2 INJECTION INTRAMUSCULAR; INTRAVENOUS; SUBCUTANEOUS at 03:53

## 2017-02-22 RX ADMIN — ONDANSETRON 4 MILLIGRAM(S): 8 TABLET, FILM COATED ORAL at 05:48

## 2017-02-22 RX ADMIN — OCTREOTIDE ACETATE 2 MICROGRAM(S)/HR: 200 INJECTION, SOLUTION INTRAVENOUS; SUBCUTANEOUS at 18:17

## 2017-02-22 RX ADMIN — ONDANSETRON 4 MILLIGRAM(S): 8 TABLET, FILM COATED ORAL at 21:05

## 2017-02-22 RX ADMIN — HALOPERIDOL DECANOATE 0.5 MILLIGRAM(S): 100 INJECTION INTRAMUSCULAR at 05:47

## 2017-02-22 RX ADMIN — PANTOPRAZOLE SODIUM 40 MILLIGRAM(S): 20 TABLET, DELAYED RELEASE ORAL at 05:47

## 2017-02-22 RX ADMIN — SODIUM CHLORIDE 20 MILLILITER(S): 9 INJECTION INTRAMUSCULAR; INTRAVENOUS; SUBCUTANEOUS at 05:47

## 2017-02-22 RX ADMIN — HYDROMORPHONE HYDROCHLORIDE 30 MILLILITER(S): 2 INJECTION INTRAMUSCULAR; INTRAVENOUS; SUBCUTANEOUS at 15:51

## 2017-02-22 RX ADMIN — HYDROMORPHONE HYDROCHLORIDE 30 MILLILITER(S): 2 INJECTION INTRAMUSCULAR; INTRAVENOUS; SUBCUTANEOUS at 07:13

## 2017-02-22 RX ADMIN — PANTOPRAZOLE SODIUM 40 MILLIGRAM(S): 20 TABLET, DELAYED RELEASE ORAL at 18:17

## 2017-02-22 RX ADMIN — Medication 6 MILLIGRAM(S): at 05:48

## 2017-02-22 RX ADMIN — Medication 6 MILLIGRAM(S): at 21:05

## 2017-02-22 RX ADMIN — HALOPERIDOL DECANOATE 0.5 MILLIGRAM(S): 100 INJECTION INTRAMUSCULAR at 18:17

## 2017-02-23 ENCOUNTER — INPATIENT (INPATIENT)
Facility: HOSPITAL | Age: 77
LOS: 5 days | Discharge: HOME | DRG: 389 | End: 2017-03-01
Attending: INTERNAL MEDICINE | Admitting: INTERNAL MEDICINE
Payer: MEDICAID

## 2017-02-23 ENCOUNTER — TRANSCRIPTION ENCOUNTER (OUTPATIENT)
Age: 77
End: 2017-02-23

## 2017-02-23 VITALS
RESPIRATION RATE: 10 BRPM | HEART RATE: 65 BPM | OXYGEN SATURATION: 100 % | TEMPERATURE: 98 F | SYSTOLIC BLOOD PRESSURE: 131 MMHG | DIASTOLIC BLOOD PRESSURE: 82 MMHG

## 2017-02-23 VITALS
SYSTOLIC BLOOD PRESSURE: 131 MMHG | OXYGEN SATURATION: 100 % | WEIGHT: 122.36 LBS | DIASTOLIC BLOOD PRESSURE: 82 MMHG | HEIGHT: 61.97 IN | RESPIRATION RATE: 10 BRPM | HEART RATE: 65 BPM | TEMPERATURE: 98 F

## 2017-02-23 DIAGNOSIS — N13.9 OBSTRUCTIVE AND REFLUX UROPATHY, UNSPECIFIED: ICD-10-CM

## 2017-02-23 DIAGNOSIS — Z98.890 OTHER SPECIFIED POSTPROCEDURAL STATES: Chronic | ICD-10-CM

## 2017-02-23 DIAGNOSIS — C18.9 MALIGNANT NEOPLASM OF COLON, UNSPECIFIED: ICD-10-CM

## 2017-02-23 DIAGNOSIS — R07.9 CHEST PAIN, UNSPECIFIED: ICD-10-CM

## 2017-02-23 DIAGNOSIS — Z51.5 ENCOUNTER FOR PALLIATIVE CARE: ICD-10-CM

## 2017-02-23 DIAGNOSIS — Z87.448 PERSONAL HISTORY OF OTHER DISEASES OF URINARY SYSTEM: Chronic | ICD-10-CM

## 2017-02-23 DIAGNOSIS — Z93.3 COLOSTOMY STATUS: Chronic | ICD-10-CM

## 2017-02-23 DIAGNOSIS — K56.69 OTHER INTESTINAL OBSTRUCTION: ICD-10-CM

## 2017-02-23 LAB
CULTURE RESULTS: SIGNIFICANT CHANGE UP
CULTURE RESULTS: SIGNIFICANT CHANGE UP
SPECIMEN SOURCE: SIGNIFICANT CHANGE UP
SPECIMEN SOURCE: SIGNIFICANT CHANGE UP

## 2017-02-23 PROCEDURE — 99239 HOSP IP/OBS DSCHRG MGMT >30: CPT

## 2017-02-23 PROCEDURE — 99222 1ST HOSP IP/OBS MODERATE 55: CPT | Mod: GC

## 2017-02-23 PROCEDURE — 74176 CT ABD & PELVIS W/O CONTRAST: CPT | Mod: 26

## 2017-02-23 RX ORDER — OCTREOTIDE ACETATE 200 UG/ML
10 INJECTION, SOLUTION INTRAVENOUS; SUBCUTANEOUS
Qty: 500 | Refills: 0 | Status: DISCONTINUED | OUTPATIENT
Start: 2017-02-23 | End: 2017-02-25

## 2017-02-23 RX ORDER — DEXAMETHASONE 0.5 MG/5ML
6 ELIXIR ORAL EVERY 8 HOURS
Qty: 0 | Refills: 0 | Status: DISCONTINUED | OUTPATIENT
Start: 2017-02-23 | End: 2017-02-25

## 2017-02-23 RX ORDER — PANTOPRAZOLE SODIUM 20 MG/1
40 TABLET, DELAYED RELEASE ORAL
Qty: 0 | Refills: 0 | COMMUNITY
Start: 2017-02-23

## 2017-02-23 RX ORDER — HYDROMORPHONE HYDROCHLORIDE 2 MG/ML
1 INJECTION INTRAMUSCULAR; INTRAVENOUS; SUBCUTANEOUS
Qty: 0 | Refills: 0 | COMMUNITY
Start: 2017-02-23

## 2017-02-23 RX ORDER — ONDANSETRON 8 MG/1
0 TABLET, FILM COATED ORAL
Qty: 0 | Refills: 0 | COMMUNITY
Start: 2017-02-23

## 2017-02-23 RX ORDER — HALOPERIDOL DECANOATE 100 MG/ML
0.5 INJECTION INTRAMUSCULAR
Qty: 0 | Refills: 0 | Status: DISCONTINUED | OUTPATIENT
Start: 2017-02-23 | End: 2017-02-24

## 2017-02-23 RX ORDER — IPRATROPIUM/ALBUTEROL SULFATE 18-103MCG
3 AEROSOL WITH ADAPTER (GRAM) INHALATION
Qty: 0 | Refills: 0 | COMMUNITY
Start: 2017-02-23

## 2017-02-23 RX ORDER — SODIUM CHLORIDE 9 MG/ML
0 INJECTION INTRAMUSCULAR; INTRAVENOUS; SUBCUTANEOUS
Qty: 0 | Refills: 0 | COMMUNITY
Start: 2017-02-23

## 2017-02-23 RX ORDER — OCTREOTIDE ACETATE 200 UG/ML
0 INJECTION, SOLUTION INTRAVENOUS; SUBCUTANEOUS
Qty: 0 | Refills: 0 | COMMUNITY
Start: 2017-02-23

## 2017-02-23 RX ORDER — HYDROMORPHONE HYDROCHLORIDE 2 MG/ML
30 INJECTION INTRAMUSCULAR; INTRAVENOUS; SUBCUTANEOUS
Qty: 0 | Refills: 0 | Status: DISCONTINUED | OUTPATIENT
Start: 2017-02-23 | End: 2017-02-24

## 2017-02-23 RX ORDER — HALOPERIDOL DECANOATE 100 MG/ML
0.5 INJECTION INTRAMUSCULAR EVERY 6 HOURS
Qty: 0 | Refills: 0 | Status: DISCONTINUED | OUTPATIENT
Start: 2017-02-23 | End: 2017-02-24

## 2017-02-23 RX ORDER — PANTOPRAZOLE SODIUM 20 MG/1
40 TABLET, DELAYED RELEASE ORAL
Qty: 0 | Refills: 0 | Status: DISCONTINUED | OUTPATIENT
Start: 2017-02-23 | End: 2017-03-01

## 2017-02-23 RX ORDER — IPRATROPIUM/ALBUTEROL SULFATE 18-103MCG
3 AEROSOL WITH ADAPTER (GRAM) INHALATION EVERY 4 HOURS
Qty: 0 | Refills: 0 | Status: DISCONTINUED | OUTPATIENT
Start: 2017-02-23 | End: 2017-03-01

## 2017-02-23 RX ORDER — ONDANSETRON 8 MG/1
4 TABLET, FILM COATED ORAL EVERY 8 HOURS
Qty: 0 | Refills: 0 | Status: DISCONTINUED | OUTPATIENT
Start: 2017-02-23 | End: 2017-02-26

## 2017-02-23 RX ORDER — SODIUM CHLORIDE 9 MG/ML
1000 INJECTION INTRAMUSCULAR; INTRAVENOUS; SUBCUTANEOUS
Qty: 0 | Refills: 0 | Status: DISCONTINUED | OUTPATIENT
Start: 2017-02-23 | End: 2017-03-01

## 2017-02-23 RX ADMIN — PANTOPRAZOLE SODIUM 40 MILLIGRAM(S): 20 TABLET, DELAYED RELEASE ORAL at 17:21

## 2017-02-23 RX ADMIN — HYDROMORPHONE HYDROCHLORIDE 30 MILLILITER(S): 2 INJECTION INTRAMUSCULAR; INTRAVENOUS; SUBCUTANEOUS at 19:42

## 2017-02-23 RX ADMIN — HALOPERIDOL DECANOATE 0.5 MILLIGRAM(S): 100 INJECTION INTRAMUSCULAR at 00:31

## 2017-02-23 RX ADMIN — OCTREOTIDE ACETATE 2 MICROGRAM(S)/HR: 200 INJECTION, SOLUTION INTRAVENOUS; SUBCUTANEOUS at 19:43

## 2017-02-23 RX ADMIN — HYDROMORPHONE HYDROCHLORIDE 30 MILLILITER(S): 2 INJECTION INTRAMUSCULAR; INTRAVENOUS; SUBCUTANEOUS at 17:22

## 2017-02-23 RX ADMIN — HALOPERIDOL DECANOATE 0.5 MILLIGRAM(S): 100 INJECTION INTRAMUSCULAR at 17:21

## 2017-02-23 RX ADMIN — ONDANSETRON 4 MILLIGRAM(S): 8 TABLET, FILM COATED ORAL at 21:09

## 2017-02-23 RX ADMIN — HALOPERIDOL DECANOATE 0.5 MILLIGRAM(S): 100 INJECTION INTRAMUSCULAR at 06:00

## 2017-02-23 RX ADMIN — PANTOPRAZOLE SODIUM 40 MILLIGRAM(S): 20 TABLET, DELAYED RELEASE ORAL at 06:00

## 2017-02-23 RX ADMIN — HYDROMORPHONE HYDROCHLORIDE 30 MILLILITER(S): 2 INJECTION INTRAMUSCULAR; INTRAVENOUS; SUBCUTANEOUS at 07:13

## 2017-02-23 RX ADMIN — SODIUM CHLORIDE 20 MILLILITER(S): 9 INJECTION INTRAMUSCULAR; INTRAVENOUS; SUBCUTANEOUS at 19:43

## 2017-02-23 RX ADMIN — Medication 6 MILLIGRAM(S): at 06:00

## 2017-02-23 RX ADMIN — HALOPERIDOL DECANOATE 0.5 MILLIGRAM(S): 100 INJECTION INTRAMUSCULAR at 11:13

## 2017-02-23 RX ADMIN — ONDANSETRON 4 MILLIGRAM(S): 8 TABLET, FILM COATED ORAL at 13:15

## 2017-02-23 RX ADMIN — ONDANSETRON 4 MILLIGRAM(S): 8 TABLET, FILM COATED ORAL at 06:00

## 2017-02-23 RX ADMIN — OCTREOTIDE ACETATE 2 MICROGRAM(S)/HR: 200 INJECTION, SOLUTION INTRAVENOUS; SUBCUTANEOUS at 13:15

## 2017-02-23 RX ADMIN — HYDROMORPHONE HYDROCHLORIDE 30 MILLILITER(S): 2 INJECTION INTRAMUSCULAR; INTRAVENOUS; SUBCUTANEOUS at 04:09

## 2017-02-23 RX ADMIN — Medication 6 MILLIGRAM(S): at 21:09

## 2017-02-23 RX ADMIN — Medication 6 MILLIGRAM(S): at 13:15

## 2017-02-23 NOTE — H&P ADULT. - ASSESSMENT
76F Mandarin-speaking PMH Stage 4 colon Ca with mets to LN, uterus, and possibly lung s/p partial colectomy with Carmine procedure (done in [place] in 2014), c/b enterocolic fistula, b/l nephrostomy tubes (done in 2015), on home hospice ([facility]) initially presented to hospital on 2/18/17 with abdominal pain and decreased output into colostomy bag x 2 days 2/2 SBO identified on CT abdomen, subsequently admitted to PCU for ongoing medical management and symptom control.

## 2017-02-23 NOTE — H&P ADULT. - PROBLEM SELECTOR PLAN 3
S/p nephrostomy tube placement in 2015. Right nephrostomy noted to be draining less, per IR likely 2/2 kidney atrophy on that side  - continue with nephrostomy care, monitor urine output

## 2017-02-23 NOTE — DISCHARGE NOTE ADULT - MEDICATION SUMMARY - MEDICATIONS TO TAKE
I will START or STAY ON the medications listed below when I get home from the hospital:    dexamethasone 6 mg oral tablet  -- 1 tab(s) by mouth every 8 hours -for nausea  -- Indication: For Nausea    HYDROmorphone 100 mg/50 mL-D5% intravenous solution  -- 1 milliliter(s) intravenous every 4 hours, As needed, breakthrough pain  -- Indication: For pain control    ondansetron 2 mg/mL injectable solution  --  injectable   -- Indication: For Nausea    albuterol-ipratropium 2.5 mg-0.5 mg/3 mL inhalation solution  -- 3 milliliter(s) inhaled every 4 hours, As needed, Shortness of Breath  -- Indication: For dyspnea    bisacodyl 10 mg rectal suppository  -- 1 suppository(ies) rectally once a day, As needed, Constipation  -- Indication: For Constipation    sodium chloride 0.9% injectable solution  --  injectable   -- Indication: For hydartion    pantoprazole 40 mg intravenous injection  -- 40 milligram(s) intravenous 2 times a day  -- Indication: For gerd    octreotide  --     -- Indication: For gerd

## 2017-02-23 NOTE — H&P ADULT. - PROBLEM SELECTOR PLAN 4
Ongoing goals of care discussion with daughter, who remains at bedside. Patient is currently DNR/DNI. Plan to discuss potential for venting gastrostomy tube placement pending results of CT abdomen. Continue with supportive care and symptom management for now.

## 2017-02-23 NOTE — DISCHARGE NOTE ADULT - HOSPITAL COURSE
·	76F Mandarin-speaking PMH Stage 4 colon Ca with mets to LN, uterus, and possibly lung s/p partial colectomy with Carmine procedure (done in [place] in 2014), c/b enterocolic fistula, b/l nephrostomy tubes (done in 2015), on home hospice ([facility]) p/w abdominal pain and decreased output into colostomy bag x 2 days 2/2 SBO. Patient subsequently admitted to PCU for medical management of SBO and symptomatic treatment. Patient was managed with NGT placement to suction for decompression. IR team came by to assess integrity of nephrostomy tubes as patient R tube not draining. Per IR, tubes remain in place and decreased drainage likely due to right sided kidney atrophy. Patient had NGT dislodged by family member assisted ambulation. Patient planned for repeat CT scan to assess SBO resolution, and assessment for potential surgical venting gastrostomy tube. Patient continued to receive supportive care and pain control throughout her admission.

## 2017-02-23 NOTE — H&P ADULT. - HISTORY OF PRESENT ILLNESS
76F Mandarin-speaking PMH Stage 4 colon Ca with mets to LN, uterus, and possibly lung s/p partial colectomy with Carmine procedure (done in [place] in 2014), c/b enterocolic fistula, b/l nephrostomy tubes (done in 2015), on home hospice ([facility]) initially presented to hospital on 2/18/17 with abdominal pain and decreased output into colostomy bag x 2 days 2/2 SBO identified on CT abdomen. Patient subsequently admitted to PCU for medical management of SBO and symptomatic treatment. Patient was managed with NGT placement to suction for decompression. During hospital course, IR team came by to assess integrity of nephrostomy tubes as patient R tube not draining. Per IR, tubes remain in place and decreased drainage likely due to right sided kidney atrophy. Patient had NGT dislodged by family member assisted ambulation on 2/23/17. Patient planned for repeat CT scan to assess SBO resolution, and assessment for potential surgical venting gastrostomy tube vs NG tube replacement. Patient continued to receive supportive care and pain control throughout her admission. Patient discharged 2/23 from hospice and immediately readmitted to [facility] for ongoing PCU care.

## 2017-02-23 NOTE — DISCHARGE NOTE ADULT - MEDICATION SUMMARY - MEDICATIONS TO STOP TAKING
I will STOP taking the medications listed below when I get home from the hospital:    aluminum hydroxide-magnesium hydroxide 200 mg-200 mg/5 mL oral suspension  -- 30 milliliter(s) by mouth 1 to 2 times a day, As Needed, Dyspepsia -for heartburn    lidocaine 2% topical gel with applicator  -- 1 application on skin every 12 hours    senna 8.8 mg/5 mL oral syrup  -- 10 milliliter(s) by mouth once a day, As needed, Constipation    HYDROmorphone  --   dilaudid PCA at 2.5mg per hour continuous with 5mg IV Q30min prn breakthrough pain

## 2017-02-23 NOTE — H&P ADULT. - PROBLEM SELECTOR PLAN 2
Patient not candidate for disease modifying therapy at this time  - continue with supportive care, symptomatic management

## 2017-02-23 NOTE — DISCHARGE NOTE ADULT - CARE PLAN
Principal Discharge DX:	Malignant neoplasm of colon, unspecified part of colon  Goal:	Palliative care management  Instructions for follow-up, activity and diet:	Admission to PCU for ongoing symptomatic management and support.

## 2017-02-24 PROCEDURE — 99233 SBSQ HOSP IP/OBS HIGH 50: CPT | Mod: GC

## 2017-02-24 RX ORDER — HALOPERIDOL DECANOATE 100 MG/ML
0.5 INJECTION INTRAMUSCULAR
Qty: 0 | Refills: 0 | Status: DISCONTINUED | OUTPATIENT
Start: 2017-02-24 | End: 2017-03-01

## 2017-02-24 RX ORDER — HALOPERIDOL DECANOATE 100 MG/ML
0.5 INJECTION INTRAMUSCULAR EVERY 6 HOURS
Qty: 0 | Refills: 0 | Status: DISCONTINUED | OUTPATIENT
Start: 2017-02-24 | End: 2017-02-25

## 2017-02-24 RX ORDER — HYDROMORPHONE HYDROCHLORIDE 2 MG/ML
30 INJECTION INTRAMUSCULAR; INTRAVENOUS; SUBCUTANEOUS
Qty: 0 | Refills: 0 | Status: DISCONTINUED | OUTPATIENT
Start: 2017-02-24 | End: 2017-02-25

## 2017-02-24 RX ADMIN — ONDANSETRON 4 MILLIGRAM(S): 8 TABLET, FILM COATED ORAL at 05:34

## 2017-02-24 RX ADMIN — OCTREOTIDE ACETATE 2 MICROGRAM(S)/HR: 200 INJECTION, SOLUTION INTRAVENOUS; SUBCUTANEOUS at 19:37

## 2017-02-24 RX ADMIN — SODIUM CHLORIDE 20 MILLILITER(S): 9 INJECTION INTRAMUSCULAR; INTRAVENOUS; SUBCUTANEOUS at 07:15

## 2017-02-24 RX ADMIN — HYDROMORPHONE HYDROCHLORIDE 30 MILLILITER(S): 2 INJECTION INTRAMUSCULAR; INTRAVENOUS; SUBCUTANEOUS at 05:34

## 2017-02-24 RX ADMIN — HYDROMORPHONE HYDROCHLORIDE 30 MILLILITER(S): 2 INJECTION INTRAMUSCULAR; INTRAVENOUS; SUBCUTANEOUS at 22:36

## 2017-02-24 RX ADMIN — ONDANSETRON 4 MILLIGRAM(S): 8 TABLET, FILM COATED ORAL at 14:44

## 2017-02-24 RX ADMIN — HYDROMORPHONE HYDROCHLORIDE 30 MILLILITER(S): 2 INJECTION INTRAMUSCULAR; INTRAVENOUS; SUBCUTANEOUS at 07:13

## 2017-02-24 RX ADMIN — ONDANSETRON 4 MILLIGRAM(S): 8 TABLET, FILM COATED ORAL at 21:35

## 2017-02-24 RX ADMIN — Medication 6 MILLIGRAM(S): at 05:33

## 2017-02-24 RX ADMIN — HALOPERIDOL DECANOATE 0.5 MILLIGRAM(S): 100 INJECTION INTRAMUSCULAR at 18:17

## 2017-02-24 RX ADMIN — Medication 6 MILLIGRAM(S): at 14:44

## 2017-02-24 RX ADMIN — Medication 6 MILLIGRAM(S): at 21:35

## 2017-02-24 RX ADMIN — PANTOPRAZOLE SODIUM 40 MILLIGRAM(S): 20 TABLET, DELAYED RELEASE ORAL at 18:16

## 2017-02-24 RX ADMIN — HYDROMORPHONE HYDROCHLORIDE 30 MILLILITER(S): 2 INJECTION INTRAMUSCULAR; INTRAVENOUS; SUBCUTANEOUS at 10:02

## 2017-02-24 RX ADMIN — SODIUM CHLORIDE 20 MILLILITER(S): 9 INJECTION INTRAMUSCULAR; INTRAVENOUS; SUBCUTANEOUS at 19:25

## 2017-02-24 RX ADMIN — HALOPERIDOL DECANOATE 0.5 MILLIGRAM(S): 100 INJECTION INTRAMUSCULAR at 23:55

## 2017-02-24 RX ADMIN — PANTOPRAZOLE SODIUM 40 MILLIGRAM(S): 20 TABLET, DELAYED RELEASE ORAL at 05:33

## 2017-02-24 RX ADMIN — OCTREOTIDE ACETATE 2 MICROGRAM(S)/HR: 200 INJECTION, SOLUTION INTRAVENOUS; SUBCUTANEOUS at 07:13

## 2017-02-24 RX ADMIN — HALOPERIDOL DECANOATE 0.5 MILLIGRAM(S): 100 INJECTION INTRAMUSCULAR at 12:30

## 2017-02-24 RX ADMIN — HYDROMORPHONE HYDROCHLORIDE 30 MILLILITER(S): 2 INJECTION INTRAMUSCULAR; INTRAVENOUS; SUBCUTANEOUS at 19:24

## 2017-02-25 PROCEDURE — 99233 SBSQ HOSP IP/OBS HIGH 50: CPT | Mod: GC

## 2017-02-25 RX ORDER — HALOPERIDOL DECANOATE 100 MG/ML
0.5 INJECTION INTRAMUSCULAR EVERY 12 HOURS
Qty: 0 | Refills: 0 | Status: DISCONTINUED | OUTPATIENT
Start: 2017-02-25 | End: 2017-02-26

## 2017-02-25 RX ORDER — DEXAMETHASONE 0.5 MG/5ML
4 ELIXIR ORAL EVERY 12 HOURS
Qty: 0 | Refills: 0 | Status: DISCONTINUED | OUTPATIENT
Start: 2017-02-25 | End: 2017-02-27

## 2017-02-25 RX ORDER — HYDROMORPHONE HYDROCHLORIDE 2 MG/ML
30 INJECTION INTRAMUSCULAR; INTRAVENOUS; SUBCUTANEOUS
Qty: 0 | Refills: 0 | Status: DISCONTINUED | OUTPATIENT
Start: 2017-02-25 | End: 2017-02-26

## 2017-02-25 RX ADMIN — ONDANSETRON 4 MILLIGRAM(S): 8 TABLET, FILM COATED ORAL at 06:14

## 2017-02-25 RX ADMIN — SODIUM CHLORIDE 20 MILLILITER(S): 9 INJECTION INTRAMUSCULAR; INTRAVENOUS; SUBCUTANEOUS at 07:14

## 2017-02-25 RX ADMIN — HYDROMORPHONE HYDROCHLORIDE 30 MILLILITER(S): 2 INJECTION INTRAMUSCULAR; INTRAVENOUS; SUBCUTANEOUS at 07:12

## 2017-02-25 RX ADMIN — Medication 6 MILLIGRAM(S): at 06:13

## 2017-02-25 RX ADMIN — PANTOPRAZOLE SODIUM 40 MILLIGRAM(S): 20 TABLET, DELAYED RELEASE ORAL at 18:01

## 2017-02-25 RX ADMIN — HYDROMORPHONE HYDROCHLORIDE 30 MILLILITER(S): 2 INJECTION INTRAMUSCULAR; INTRAVENOUS; SUBCUTANEOUS at 12:31

## 2017-02-25 RX ADMIN — HYDROMORPHONE HYDROCHLORIDE 30 MILLILITER(S): 2 INJECTION INTRAMUSCULAR; INTRAVENOUS; SUBCUTANEOUS at 19:12

## 2017-02-25 RX ADMIN — ONDANSETRON 4 MILLIGRAM(S): 8 TABLET, FILM COATED ORAL at 14:04

## 2017-02-25 RX ADMIN — PANTOPRAZOLE SODIUM 40 MILLIGRAM(S): 20 TABLET, DELAYED RELEASE ORAL at 06:13

## 2017-02-25 RX ADMIN — HALOPERIDOL DECANOATE 0.5 MILLIGRAM(S): 100 INJECTION INTRAMUSCULAR at 06:14

## 2017-02-25 RX ADMIN — ONDANSETRON 4 MILLIGRAM(S): 8 TABLET, FILM COATED ORAL at 21:28

## 2017-02-25 RX ADMIN — HYDROMORPHONE HYDROCHLORIDE 30 MILLILITER(S): 2 INJECTION INTRAMUSCULAR; INTRAVENOUS; SUBCUTANEOUS at 19:52

## 2017-02-25 RX ADMIN — SODIUM CHLORIDE 20 MILLILITER(S): 9 INJECTION INTRAMUSCULAR; INTRAVENOUS; SUBCUTANEOUS at 19:30

## 2017-02-25 RX ADMIN — HALOPERIDOL DECANOATE 0.5 MILLIGRAM(S): 100 INJECTION INTRAMUSCULAR at 18:02

## 2017-02-25 RX ADMIN — Medication 4 MILLIGRAM(S): at 18:02

## 2017-02-25 RX ADMIN — OCTREOTIDE ACETATE 2 MICROGRAM(S)/HR: 200 INJECTION, SOLUTION INTRAVENOUS; SUBCUTANEOUS at 07:14

## 2017-02-26 PROCEDURE — 99233 SBSQ HOSP IP/OBS HIGH 50: CPT

## 2017-02-26 RX ORDER — HALOPERIDOL DECANOATE 100 MG/ML
0.5 INJECTION INTRAMUSCULAR EVERY 6 HOURS
Qty: 0 | Refills: 0 | Status: DISCONTINUED | OUTPATIENT
Start: 2017-02-26 | End: 2017-03-01

## 2017-02-26 RX ORDER — POLYETHYLENE GLYCOL 3350 17 G/17G
17 POWDER, FOR SOLUTION ORAL DAILY
Qty: 0 | Refills: 0 | Status: DISCONTINUED | OUTPATIENT
Start: 2017-02-26 | End: 2017-03-01

## 2017-02-26 RX ORDER — ONDANSETRON 8 MG/1
4 TABLET, FILM COATED ORAL EVERY 8 HOURS
Qty: 0 | Refills: 0 | Status: DISCONTINUED | OUTPATIENT
Start: 2017-02-26 | End: 2017-03-01

## 2017-02-26 RX ORDER — HYDROMORPHONE HYDROCHLORIDE 2 MG/ML
30 INJECTION INTRAMUSCULAR; INTRAVENOUS; SUBCUTANEOUS
Qty: 0 | Refills: 0 | Status: DISCONTINUED | OUTPATIENT
Start: 2017-02-26 | End: 2017-03-01

## 2017-02-26 RX ORDER — SENNA PLUS 8.6 MG/1
2 TABLET ORAL AT BEDTIME
Qty: 0 | Refills: 0 | Status: DISCONTINUED | OUTPATIENT
Start: 2017-02-26 | End: 2017-03-01

## 2017-02-26 RX ADMIN — HALOPERIDOL DECANOATE 0.5 MILLIGRAM(S): 100 INJECTION INTRAMUSCULAR at 05:37

## 2017-02-26 RX ADMIN — Medication 4 MILLIGRAM(S): at 05:37

## 2017-02-26 RX ADMIN — PANTOPRAZOLE SODIUM 40 MILLIGRAM(S): 20 TABLET, DELAYED RELEASE ORAL at 17:18

## 2017-02-26 RX ADMIN — Medication 4 MILLIGRAM(S): at 17:18

## 2017-02-26 RX ADMIN — HYDROMORPHONE HYDROCHLORIDE 30 MILLILITER(S): 2 INJECTION INTRAMUSCULAR; INTRAVENOUS; SUBCUTANEOUS at 19:04

## 2017-02-26 RX ADMIN — HYDROMORPHONE HYDROCHLORIDE 30 MILLILITER(S): 2 INJECTION INTRAMUSCULAR; INTRAVENOUS; SUBCUTANEOUS at 11:02

## 2017-02-26 RX ADMIN — HYDROMORPHONE HYDROCHLORIDE 30 MILLILITER(S): 2 INJECTION INTRAMUSCULAR; INTRAVENOUS; SUBCUTANEOUS at 15:34

## 2017-02-26 RX ADMIN — HYDROMORPHONE HYDROCHLORIDE 30 MILLILITER(S): 2 INJECTION INTRAMUSCULAR; INTRAVENOUS; SUBCUTANEOUS at 07:15

## 2017-02-26 RX ADMIN — ONDANSETRON 4 MILLIGRAM(S): 8 TABLET, FILM COATED ORAL at 05:37

## 2017-02-26 RX ADMIN — SENNA PLUS 2 TABLET(S): 8.6 TABLET ORAL at 21:50

## 2017-02-26 RX ADMIN — SODIUM CHLORIDE 20 MILLILITER(S): 9 INJECTION INTRAMUSCULAR; INTRAVENOUS; SUBCUTANEOUS at 19:08

## 2017-02-26 RX ADMIN — PANTOPRAZOLE SODIUM 40 MILLIGRAM(S): 20 TABLET, DELAYED RELEASE ORAL at 05:37

## 2017-02-27 ENCOUNTER — TRANSCRIPTION ENCOUNTER (OUTPATIENT)
Age: 77
End: 2017-02-27

## 2017-02-27 PROCEDURE — 99233 SBSQ HOSP IP/OBS HIGH 50: CPT

## 2017-02-27 RX ORDER — DEXAMETHASONE 0.5 MG/5ML
4 ELIXIR ORAL
Qty: 0 | Refills: 0 | Status: DISCONTINUED | OUTPATIENT
Start: 2017-02-27 | End: 2017-03-01

## 2017-02-27 RX ORDER — SENNA PLUS 8.6 MG/1
2 TABLET ORAL
Qty: 0 | Refills: 0 | COMMUNITY
Start: 2017-02-27

## 2017-02-27 RX ORDER — POLYETHYLENE GLYCOL 3350 17 G/17G
17 POWDER, FOR SOLUTION ORAL
Qty: 0 | Refills: 0 | COMMUNITY
Start: 2017-02-27

## 2017-02-27 RX ORDER — HYDROMORPHONE HYDROCHLORIDE 2 MG/ML
1 INJECTION INTRAMUSCULAR; INTRAVENOUS; SUBCUTANEOUS
Qty: 1 | Refills: 0 | OUTPATIENT
Start: 2017-02-27 | End: 2017-02-28

## 2017-02-27 RX ORDER — DEXAMETHASONE 0.5 MG/5ML
1 ELIXIR ORAL
Qty: 0 | Refills: 0 | COMMUNITY
Start: 2017-02-27

## 2017-02-27 RX ORDER — ONDANSETRON 8 MG/1
0 TABLET, FILM COATED ORAL
Qty: 0 | Refills: 0 | COMMUNITY
Start: 2017-02-27

## 2017-02-27 RX ORDER — HYDROMORPHONE HYDROCHLORIDE 2 MG/ML
1 INJECTION INTRAMUSCULAR; INTRAVENOUS; SUBCUTANEOUS
Qty: 1 | Refills: 0 | OUTPATIENT
Start: 2017-02-27

## 2017-02-27 RX ORDER — IPRATROPIUM/ALBUTEROL SULFATE 18-103MCG
3 AEROSOL WITH ADAPTER (GRAM) INHALATION
Qty: 0 | Refills: 0 | COMMUNITY
Start: 2017-02-27

## 2017-02-27 RX ORDER — ROBINUL 0.2 MG/ML
0.4 INJECTION INTRAMUSCULAR; INTRAVENOUS ONCE
Qty: 0 | Refills: 0 | Status: COMPLETED | OUTPATIENT
Start: 2017-02-27 | End: 2017-02-27

## 2017-02-27 RX ADMIN — Medication 4 MILLIGRAM(S): at 17:17

## 2017-02-27 RX ADMIN — SODIUM CHLORIDE 20 MILLILITER(S): 9 INJECTION INTRAMUSCULAR; INTRAVENOUS; SUBCUTANEOUS at 07:12

## 2017-02-27 RX ADMIN — HYDROMORPHONE HYDROCHLORIDE 30 MILLILITER(S): 2 INJECTION INTRAMUSCULAR; INTRAVENOUS; SUBCUTANEOUS at 10:37

## 2017-02-27 RX ADMIN — PANTOPRAZOLE SODIUM 40 MILLIGRAM(S): 20 TABLET, DELAYED RELEASE ORAL at 05:59

## 2017-02-27 RX ADMIN — Medication 4 MILLIGRAM(S): at 05:58

## 2017-02-27 RX ADMIN — PANTOPRAZOLE SODIUM 40 MILLIGRAM(S): 20 TABLET, DELAYED RELEASE ORAL at 17:17

## 2017-02-27 RX ADMIN — ROBINUL 0.4 MILLIGRAM(S): 0.2 INJECTION INTRAMUSCULAR; INTRAVENOUS at 10:47

## 2017-02-27 RX ADMIN — HYDROMORPHONE HYDROCHLORIDE 30 MILLILITER(S): 2 INJECTION INTRAMUSCULAR; INTRAVENOUS; SUBCUTANEOUS at 19:03

## 2017-02-27 RX ADMIN — HYDROMORPHONE HYDROCHLORIDE 30 MILLILITER(S): 2 INJECTION INTRAMUSCULAR; INTRAVENOUS; SUBCUTANEOUS at 07:11

## 2017-02-27 RX ADMIN — SENNA PLUS 2 TABLET(S): 8.6 TABLET ORAL at 21:02

## 2017-02-27 RX ADMIN — POLYETHYLENE GLYCOL 3350 17 GRAM(S): 17 POWDER, FOR SOLUTION ORAL at 20:59

## 2017-02-27 NOTE — DISCHARGE NOTE ADULT - MEDICATION SUMMARY - MEDICATIONS TO CHANGE
I will SWITCH the dose or number of times a day I take the medications listed below when I get home from the hospital:    dexamethasone 6 mg oral tablet  -- 1 tab(s) by mouth every 8 hours -for nausea

## 2017-02-27 NOTE — DISCHARGE NOTE ADULT - CARE PLAN
Principal Discharge DX:	Small bowel obstruction  Goal:	please monitor your bowel movements  Instructions for follow-up, activity and diet:	please ensure you are having regular bowel movements. If you are constipated, please call your doctor.  Secondary Diagnosis:	Colon cancer  Goal:	Continue with your PCA pump for pain control.  Secondary Diagnosis:	H/O nephrostomy  Goal:	Monitor your urine output from your nephrostomy tubes

## 2017-02-27 NOTE — DISCHARGE NOTE ADULT - HOSPITAL COURSE
76F Mandarin-speaking PMH Stage 4 colon Ca with mets to LN, uterus, and possibly lung s/p partial colectomy with Carmnie procedure (done in [place] in 2014), c/b enterocolic fistula, b/l nephrostomy tubes (done in 2015), on home hospice ([facility]) initially presented to hospital on 2/18/17 with abdominal pain and decreased output into colostomy bag x 2 days 2/2 SBO identified on CT abdomen. Patient subsequently admitted to PCU for medical management of SBO and symptomatic treatment. Patient was managed with NGT placement to suction for decompression. During hospital course, IR team came by to assess integrity of nephrostomy tubes as patient R tube not draining. Per IR, tubes remain in place and decreased drainage likely due to right sided kidney atrophy. Patient had NGT dislodged by family member assisted ambulation on 2/23/17. Patient planned for repeat CT scan to assess SBO resolution, and assessment for potential surgical venting gastrostomy tube vs NG tube replacement. Patient continued to receive supportive care and pain control throughout her admission. Patient discharged 2/23 from hospice and immediately readmitted to [facility] for ongoing PCU care. Patient's SBO resolved with octreotide and steroids, and pain managed with PCA. Hospice was reinstated on discharge with PCA pump.

## 2017-02-27 NOTE — DISCHARGE NOTE ADULT - PATIENT PORTAL LINK FT
“You can access the [employer] Patient Portal, offered by [facility], by registering with the following website: [email or web address]”

## 2017-02-27 NOTE — DISCHARGE NOTE ADULT - PLAN OF CARE
please monitor your bowel movements please ensure you are having regular bowel movements. If you are constipated, please call your doctor. Continue with your PCA pump for pain control. Monitor your urine output from your nephrostomy tubes

## 2017-02-27 NOTE — DISCHARGE NOTE ADULT - MEDICATION SUMMARY - MEDICATIONS TO TAKE
I will START or STAY ON the medications listed below when I get home from the hospital:    dexamethasone 4 mg oral tablet  -- 1 tab(s) by mouth 2 times a day  -- Indication: For bowel obstruction    Dilaudid 2 mg/mL injectable solution  -- dilaudid PCA conc 2mg/mL  disp: five 350-mL bags  basal: 1mg/hour  bolus: 1mg every 20 minutes  max: 3 boluses/h  MDD 96mg  -- Caution federal law prohibits the transfer of this drug to any person other  than the person for whom it was prescribed.  May cause drowsiness.  Alcohol may intensify this effect.  Use care when operating dangerous machinery.  This prescription cannot be refilled.    -- Indication: For pain    ondansetron 2 mg/mL injectable solution  --  injectable   -- Indication: For nausea    albuterol-ipratropium 2.5 mg-0.5 mg/3 mL inhalation solution  -- 3 milliliter(s) inhaled every 4 hours, As needed, Shortness of Breath and/or Wheezing  -- Indication: For wheezing    bisacodyl 10 mg rectal suppository  -- 1 suppository(ies) rectally once a day, As needed, Constipation  -- Indication: For constipation    senna oral tablet  -- 2 tab(s) by mouth once a day (at bedtime)  -- Indication: For constipation    polyethylene glycol 3350 oral powder for reconstitution  -- 17 gram(s) by mouth once a day, As needed, Constipation  -- Indication: For constipation    pantoprazole 40 mg oral delayed release tablet  -- 40 milligram(s) by mouth twice  -- Indication: For acid reflux

## 2017-02-28 PROCEDURE — 99233 SBSQ HOSP IP/OBS HIGH 50: CPT | Mod: GC

## 2017-02-28 RX ORDER — HYDROMORPHONE HYDROCHLORIDE 2 MG/ML
1 INJECTION INTRAMUSCULAR; INTRAVENOUS; SUBCUTANEOUS ONCE
Qty: 0 | Refills: 0 | Status: DISCONTINUED | OUTPATIENT
Start: 2017-02-28 | End: 2017-03-01

## 2017-02-28 RX ADMIN — HYDROMORPHONE HYDROCHLORIDE 30 MILLILITER(S): 2 INJECTION INTRAMUSCULAR; INTRAVENOUS; SUBCUTANEOUS at 04:01

## 2017-02-28 RX ADMIN — SODIUM CHLORIDE 20 MILLILITER(S): 9 INJECTION INTRAMUSCULAR; INTRAVENOUS; SUBCUTANEOUS at 10:16

## 2017-02-28 RX ADMIN — Medication 4 MILLIGRAM(S): at 17:15

## 2017-02-28 RX ADMIN — Medication 4 MILLIGRAM(S): at 06:22

## 2017-02-28 RX ADMIN — HYDROMORPHONE HYDROCHLORIDE 30 MILLILITER(S): 2 INJECTION INTRAMUSCULAR; INTRAVENOUS; SUBCUTANEOUS at 07:17

## 2017-02-28 RX ADMIN — SENNA PLUS 2 TABLET(S): 8.6 TABLET ORAL at 21:58

## 2017-02-28 RX ADMIN — HYDROMORPHONE HYDROCHLORIDE 30 MILLILITER(S): 2 INJECTION INTRAMUSCULAR; INTRAVENOUS; SUBCUTANEOUS at 18:24

## 2017-02-28 RX ADMIN — PANTOPRAZOLE SODIUM 40 MILLIGRAM(S): 20 TABLET, DELAYED RELEASE ORAL at 06:21

## 2017-02-28 RX ADMIN — HYDROMORPHONE HYDROCHLORIDE 30 MILLILITER(S): 2 INJECTION INTRAMUSCULAR; INTRAVENOUS; SUBCUTANEOUS at 19:23

## 2017-02-28 RX ADMIN — PANTOPRAZOLE SODIUM 40 MILLIGRAM(S): 20 TABLET, DELAYED RELEASE ORAL at 17:15

## 2017-02-28 RX ADMIN — POLYETHYLENE GLYCOL 3350 17 GRAM(S): 17 POWDER, FOR SOLUTION ORAL at 10:15

## 2017-03-01 VITALS
DIASTOLIC BLOOD PRESSURE: 80 MMHG | SYSTOLIC BLOOD PRESSURE: 131 MMHG | HEART RATE: 67 BPM | OXYGEN SATURATION: 99 % | RESPIRATION RATE: 16 BRPM | TEMPERATURE: 98 F

## 2017-03-01 PROCEDURE — 99233 SBSQ HOSP IP/OBS HIGH 50: CPT | Mod: GC

## 2017-03-01 RX ORDER — DOCUSATE SODIUM 100 MG
10 CAPSULE ORAL
Qty: 0 | Refills: 0 | COMMUNITY

## 2017-03-01 RX ORDER — PANTOPRAZOLE SODIUM 20 MG/1
40 TABLET, DELAYED RELEASE ORAL
Qty: 0 | Refills: 0 | COMMUNITY

## 2017-03-01 RX ORDER — DOCUSATE SODIUM 100 MG
1 CAPSULE ORAL
Qty: 0 | Refills: 0 | COMMUNITY

## 2017-03-01 RX ORDER — DOCUSATE SODIUM 100 MG
10 CAPSULE ORAL
Qty: 280 | Refills: 0 | OUTPATIENT
Start: 2017-03-01 | End: 2017-03-15

## 2017-03-01 RX ORDER — PANTOPRAZOLE SODIUM 20 MG/1
40 TABLET, DELAYED RELEASE ORAL
Qty: 1120 | Refills: 0 | OUTPATIENT
Start: 2017-03-01 | End: 2017-03-15

## 2017-03-01 RX ORDER — SENNA PLUS 8.6 MG/1
10 TABLET ORAL
Qty: 200 | Refills: 0 | OUTPATIENT
Start: 2017-03-01 | End: 2017-03-11

## 2017-03-01 RX ORDER — DEXAMETHASONE 0.5 MG/5ML
1 ELIXIR ORAL
Qty: 28 | Refills: 0 | OUTPATIENT
Start: 2017-03-01 | End: 2017-03-15

## 2017-03-01 RX ADMIN — SODIUM CHLORIDE 20 MILLILITER(S): 9 INJECTION INTRAMUSCULAR; INTRAVENOUS; SUBCUTANEOUS at 05:49

## 2017-03-01 RX ADMIN — SODIUM CHLORIDE 20 MILLILITER(S): 9 INJECTION INTRAMUSCULAR; INTRAVENOUS; SUBCUTANEOUS at 08:01

## 2017-03-01 RX ADMIN — Medication 4 MILLIGRAM(S): at 05:49

## 2017-03-01 RX ADMIN — HYDROMORPHONE HYDROCHLORIDE 30 MILLILITER(S): 2 INJECTION INTRAMUSCULAR; INTRAVENOUS; SUBCUTANEOUS at 07:23

## 2017-03-01 RX ADMIN — PANTOPRAZOLE SODIUM 40 MILLIGRAM(S): 20 TABLET, DELAYED RELEASE ORAL at 05:48

## 2017-03-01 RX ADMIN — HYDROMORPHONE HYDROCHLORIDE 30 MILLILITER(S): 2 INJECTION INTRAMUSCULAR; INTRAVENOUS; SUBCUTANEOUS at 11:14

## 2017-03-12 PROCEDURE — 74176 CT ABD & PELVIS W/O CONTRAST: CPT

## 2017-03-29 ENCOUNTER — INPATIENT (INPATIENT)
Facility: HOSPITAL | Age: 77
LOS: 8 days | Discharge: HOSPICE | DRG: 871 | End: 2017-04-07
Attending: INTERNAL MEDICINE | Admitting: STUDENT IN AN ORGANIZED HEALTH CARE EDUCATION/TRAINING PROGRAM
Payer: MEDICAID

## 2017-03-29 VITALS
TEMPERATURE: 101 F | RESPIRATION RATE: 16 BRPM | OXYGEN SATURATION: 97 % | DIASTOLIC BLOOD PRESSURE: 60 MMHG | SYSTOLIC BLOOD PRESSURE: 101 MMHG | HEART RATE: 107 BPM

## 2017-03-29 DIAGNOSIS — Z93.3 COLOSTOMY STATUS: Chronic | ICD-10-CM

## 2017-03-29 DIAGNOSIS — Z87.448 PERSONAL HISTORY OF OTHER DISEASES OF URINARY SYSTEM: Chronic | ICD-10-CM

## 2017-03-29 DIAGNOSIS — Z98.890 OTHER SPECIFIED POSTPROCEDURAL STATES: Chronic | ICD-10-CM

## 2017-03-29 LAB
ALBUMIN SERPL ELPH-MCNC: 3 G/DL — LOW (ref 3.3–5)
ALP SERPL-CCNC: 86 U/L — SIGNIFICANT CHANGE UP (ref 40–120)
ALT FLD-CCNC: 11 U/L RC — SIGNIFICANT CHANGE UP (ref 10–45)
ANION GAP SERPL CALC-SCNC: 15 MMOL/L — SIGNIFICANT CHANGE UP (ref 5–17)
APPEARANCE UR: ABNORMAL
APTT BLD: 27.8 SEC — SIGNIFICANT CHANGE UP (ref 27.5–37.4)
AST SERPL-CCNC: 14 U/L — SIGNIFICANT CHANGE UP (ref 10–40)
BACTERIA # UR AUTO: ABNORMAL /HPF
BASE EXCESS BLDV CALC-SCNC: 2.5 MMOL/L — HIGH (ref -2–2)
BASOPHILS # BLD AUTO: 0 K/UL — SIGNIFICANT CHANGE UP (ref 0–0.2)
BILIRUB SERPL-MCNC: 0.4 MG/DL — SIGNIFICANT CHANGE UP (ref 0.2–1.2)
BILIRUB UR-MCNC: NEGATIVE — SIGNIFICANT CHANGE UP
BUN SERPL-MCNC: 25 MG/DL — HIGH (ref 7–23)
CA-I SERPL-SCNC: 1.11 MMOL/L — LOW (ref 1.12–1.3)
CALCIUM SERPL-MCNC: 8.3 MG/DL — LOW (ref 8.4–10.5)
CHLORIDE BLDV-SCNC: 95 MMOL/L — LOW (ref 96–108)
CHLORIDE SERPL-SCNC: 93 MMOL/L — LOW (ref 96–108)
CO2 BLDV-SCNC: 27 MMOL/L — SIGNIFICANT CHANGE UP (ref 22–30)
CO2 SERPL-SCNC: 24 MMOL/L — SIGNIFICANT CHANGE UP (ref 22–31)
COLOR SPEC: YELLOW — SIGNIFICANT CHANGE UP
CREAT SERPL-MCNC: 0.93 MG/DL — SIGNIFICANT CHANGE UP (ref 0.5–1.3)
DIFF PNL FLD: ABNORMAL
EOSINOPHIL # BLD AUTO: 0 K/UL — SIGNIFICANT CHANGE UP (ref 0–0.5)
EPI CELLS # UR: SIGNIFICANT CHANGE UP /HPF
GAS PNL BLDV: 129 MMOL/L — LOW (ref 136–145)
GAS PNL BLDV: SIGNIFICANT CHANGE UP
GAS PNL BLDV: SIGNIFICANT CHANGE UP
GLUCOSE BLDV-MCNC: 122 MG/DL — HIGH (ref 70–99)
GLUCOSE SERPL-MCNC: 112 MG/DL — HIGH (ref 70–99)
GLUCOSE UR QL: NEGATIVE — SIGNIFICANT CHANGE UP
HCO3 BLDV-SCNC: 26 MMOL/L — SIGNIFICANT CHANGE UP (ref 21–29)
HCT VFR BLD CALC: 30.6 % — LOW (ref 34.5–45)
HCT VFR BLDA CALC: 30 % — LOW (ref 39–50)
HGB BLD CALC-MCNC: 9.8 G/DL — LOW (ref 11.5–15.5)
HGB BLD-MCNC: 10.2 G/DL — LOW (ref 11.5–15.5)
INR BLD: 1.04 RATIO — SIGNIFICANT CHANGE UP (ref 0.88–1.16)
KETONES UR-MCNC: NEGATIVE — SIGNIFICANT CHANGE UP
LACTATE BLDV-MCNC: 2.8 MMOL/L — HIGH (ref 0.7–2)
LEUKOCYTE ESTERASE UR-ACNC: ABNORMAL
LYMPHOCYTES # BLD AUTO: 0.5 K/UL — LOW (ref 1–3.3)
LYMPHOCYTES # BLD AUTO: 4 % — LOW (ref 13–44)
MCHC RBC-ENTMCNC: 30.6 PG — SIGNIFICANT CHANGE UP (ref 27–34)
MCHC RBC-ENTMCNC: 33.4 GM/DL — SIGNIFICANT CHANGE UP (ref 32–36)
MCV RBC AUTO: 91.7 FL — SIGNIFICANT CHANGE UP (ref 80–100)
METAMYELOCYTES # FLD: 1 % — HIGH (ref 0–0)
MONOCYTES # BLD AUTO: 0.2 K/UL — SIGNIFICANT CHANGE UP (ref 0–0.9)
MONOCYTES NFR BLD AUTO: 2 % — SIGNIFICANT CHANGE UP (ref 2–14)
MYELOCYTES NFR BLD: 1 % — HIGH (ref 0–0)
NEUTROPHILS # BLD AUTO: 9.8 K/UL — HIGH (ref 1.8–7.4)
NEUTROPHILS NFR BLD AUTO: 88 % — HIGH (ref 43–77)
NEUTS BAND # BLD: 3 % — SIGNIFICANT CHANGE UP (ref 0–8)
NITRITE UR-MCNC: NEGATIVE — SIGNIFICANT CHANGE UP
PCO2 BLDV: 39 MMHG — SIGNIFICANT CHANGE UP (ref 35–50)
PH BLDV: 7.44 — SIGNIFICANT CHANGE UP (ref 7.35–7.45)
PH UR: 8 — SIGNIFICANT CHANGE UP (ref 4.8–8)
PLAT MORPH BLD: NORMAL — SIGNIFICANT CHANGE UP
PLATELET # BLD AUTO: 327 K/UL — SIGNIFICANT CHANGE UP (ref 150–400)
PO2 BLDV: 71 MMHG — HIGH (ref 25–45)
POTASSIUM BLDV-SCNC: 3.6 MMOL/L — SIGNIFICANT CHANGE UP (ref 3.5–5)
POTASSIUM SERPL-MCNC: 4 MMOL/L — SIGNIFICANT CHANGE UP (ref 3.5–5.3)
POTASSIUM SERPL-SCNC: 4 MMOL/L — SIGNIFICANT CHANGE UP (ref 3.5–5.3)
PROT SERPL-MCNC: 5.8 G/DL — LOW (ref 6–8.3)
PROT UR-MCNC: 300 MG/DL
PROTHROM AB SERPL-ACNC: 11.2 SEC — SIGNIFICANT CHANGE UP (ref 9.8–12.7)
RBC # BLD: 3.34 M/UL — LOW (ref 3.8–5.2)
RBC # FLD: 15.2 % — HIGH (ref 10.3–14.5)
RBC BLD AUTO: SIGNIFICANT CHANGE UP
RBC CASTS # UR COMP ASSIST: >50 /HPF (ref 0–2)
SAO2 % BLDV: 94 % — HIGH (ref 67–88)
SODIUM SERPL-SCNC: 132 MMOL/L — LOW (ref 135–145)
SP GR SPEC: 1.01 — LOW (ref 1.01–1.02)
UROBILINOGEN FLD QL: NEGATIVE — SIGNIFICANT CHANGE UP
VARIANT LYMPHS # BLD: 1 % — SIGNIFICANT CHANGE UP (ref 0–6)
WBC # BLD: 10.5 K/UL — SIGNIFICANT CHANGE UP (ref 3.8–10.5)
WBC # FLD AUTO: 10.5 K/UL — SIGNIFICANT CHANGE UP (ref 3.8–10.5)
WBC UR QL: SIGNIFICANT CHANGE UP /HPF (ref 0–5)

## 2017-03-29 PROCEDURE — 74176 CT ABD & PELVIS W/O CONTRAST: CPT | Mod: 26

## 2017-03-29 PROCEDURE — 99285 EMERGENCY DEPT VISIT HI MDM: CPT

## 2017-03-29 RX ORDER — MEROPENEM 1 G/30ML
1000 INJECTION INTRAVENOUS EVERY 8 HOURS
Qty: 0 | Refills: 0 | Status: DISCONTINUED | OUTPATIENT
Start: 2017-03-29 | End: 2017-04-06

## 2017-03-29 RX ORDER — ACETAMINOPHEN 500 MG
1000 TABLET ORAL ONCE
Qty: 0 | Refills: 0 | Status: COMPLETED | OUTPATIENT
Start: 2017-03-29 | End: 2017-03-29

## 2017-03-29 RX ORDER — VANCOMYCIN HCL 1 G
1000 VIAL (EA) INTRAVENOUS ONCE
Qty: 0 | Refills: 0 | Status: COMPLETED | OUTPATIENT
Start: 2017-03-29 | End: 2017-03-30

## 2017-03-29 RX ADMIN — Medication 400 MILLIGRAM(S): at 23:47

## 2017-03-29 NOTE — ED PROVIDER NOTE - PHYSICAL EXAMINATION
pt groaining in response to questions. not following commands, awake. alert NAD. appears pale, unwell. Skin is warm. HR irregular, normal rate. airwary patent, no stridor, ABD with multiple masses, LLQ colostomy in place. Right nephrostomy tube in place. Left nephrostomy wound dressing c/d/i, no tube. bl lower ext pitting edema. pt groaining in response to questions. not following commands, awake. alert NAD. appears pale, unwell. Skin is warm. HR irregular, normal rate. airwary patent, no stridor, ABD with multiple masses, LLQ colostomy in place. Right nephrostomy tube in place. Left nephrostomy wound dressing c/d/i, no tube. bl lower ext pitting edema.  RUE PICC in place with dilaudid pump attached.

## 2017-03-29 NOTE — ED PROVIDER NOTE - OBJECTIVE STATEMENT
75yo F colon cancer s/p colostomy, s/p bl nephrostomy for obstruction 2/2 tumor burden, left nephrostomy pulled out today. Pts daughter states pt has sob, and ams - did not respond to her when she talks to her. has been pulled from left side.

## 2017-03-29 NOTE — ED ADULT NURSE NOTE - OBJECTIVE STATEMENT
75 y/o female pt presents to ED by EMS from at home hospice for right nephrostomy tube dislocation. pt is AAOx1. pt vss. pt febrile on arrival to ED. daughter states " she is in hospice at home". pt has PICC line in right arm with Dilaudid running, bilateral nephrostomy tubes with one dislocated. skin warm, dry, and intact. 75 y/o female pt presents to ED by EMS from at home hospice for right nephrostomy tube dislocation. pt is AAOx1. pt vss. pt febrile on arrival to ED. daughter states " she is in hospice at home". pt has PICC line in right arm with Dilaudid running, bilateral nephrostomy tubes with one dislocated. skin warm, dry, and intact. pt has colostomy bag in right quadrant.

## 2017-03-29 NOTE — ED PROVIDER NOTE - ATTENDING CONTRIBUTION TO CARE
MD Louis:  patient seen and evaluated with the resident.  I was present for key portions of the History & Physical, and I agree with the Impression & Plan.  MD Louis:  75 yo F, bib EMS after family called EMS because her LLE nephrostomy tube fell out this AM, and then she became febrile tonight.  In the ED, oral temp = 102.  .  patient on home hospice for stage 4 colon CA; has extensive abdominal disease, including bilateral obstructive uropathy - hence the nephrostomy tubes.  Initially placed at [facility], but most recently changed at [facility].  Daughter with her at bedside; and is clear that she is DNR/DNI; but wants the nephrostomy tube replaced.  Physical Exam: Elderly F, chronically ill appearing, nonverbal (baseline), NCAT, no respiratory distress, CTA B, RRR, abd:  L nephrostomy site without tube, and without erythema.  Impression: possible early urosepsis from obstructive uropathy.  Plan:  contact hospice care network, labs, d/w radiology possible replace tubes.

## 2017-03-29 NOTE — ED PROVIDER NOTE - PROGRESS NOTE DETAILS
Case d/w Hospice Care Nurse, Colleen, who had already spoke with the patient's daughter and advised her NOT to go to the ED for an emergency nephrostomy tube replacement, but the patient's daughter called 911 anyway. Patient clinically deteriorating.  hypotensive and tacycardic.  Dr. Benitez and I had long conversation with daughter regarding the severity of her mother's illness, and have explained to her the high likelihood of her impending death.  Part of the daughter's hesitancy to let go is that her mother is her only family.    She has finally come to  with the advanced nature of her disease process, and that it does not make sense to perform an emergency procedure (IR replacement of nephrostomy tube) or start vasopressors on a patient who is hospice and actively dying.  She is agreeable to comfort care.  Patient already on dilaudid pump.  Receiving IVF, abx.  Palliative care unit contacted bu there are no beds. Patient clinically deteriorating.  hypotensive and tacycardic.  Dr. Benitez and I had long conversation with daughter regarding the severity of her mother's illness, and have explained to her the high likelihood of her impending death.  Part of the daughter's hesitancy to let go is that her mother is her only family.    She has finally come to  with the advanced nature of her disease process, and that it does not make sense to perform an emergency procedure (IR replacement of nephrostomy tube) or start vasopressors on a patient who is hospice and actively dying.  She is agreeable to comfort care.  Patient already on dilaudid pump.  Receiving IVF, abx.  Palliative care unit contacted but there are no beds.  Radiology made aware of clinical deterioration, as well as admitting team, who are requesting MICU consult (because floor team unwilling to take patient with BP 84/60).

## 2017-03-29 NOTE — ED PROVIDER NOTE - MEDICAL DECISION MAKING DETAILS
MD Louis:  75 yo F, bib EMS after family called EMS because her LLE nephrostomy tube fell out this AM, and then she became febrile tonight.  In the ED, oral temp = 102.  .  patient on home hospice for stage 4 colon CA; has extensive abdominal disease, including bilateral obstructive uropathy - hence the nephrostomy tubes.  Initially placed at [facility], but most recently changed at [facility].  Daughter with her at bedside; and is clear that she is DNR/DNI; but wants the nephrostomy tube replaced.  Physical Exam: Elderly F, chronically ill appearing, nonverbal (baseline), NCAT, no respiratory distress, CTA B, RRR, abd:  L nephrostomy site without tube, and without erythema.  Impression: possible early urosepsis from obstructive uropathy.  Plan:  contact hospice care network, labs, d/w radiology possible replace tubes.

## 2017-03-30 DIAGNOSIS — R50.9 FEVER, UNSPECIFIED: ICD-10-CM

## 2017-03-30 DIAGNOSIS — C18.9 MALIGNANT NEOPLASM OF COLON, UNSPECIFIED: ICD-10-CM

## 2017-03-30 DIAGNOSIS — A41.9 SEPSIS, UNSPECIFIED ORGANISM: ICD-10-CM

## 2017-03-30 DIAGNOSIS — Z71.89 OTHER SPECIFIED COUNSELING: ICD-10-CM

## 2017-03-30 DIAGNOSIS — Z41.8 ENCOUNTER FOR OTHER PROCEDURES FOR PURPOSES OTHER THAN REMEDYING HEALTH STATE: ICD-10-CM

## 2017-03-30 DIAGNOSIS — E87.1 HYPO-OSMOLALITY AND HYPONATREMIA: ICD-10-CM

## 2017-03-30 DIAGNOSIS — T83.022A DISPLACEMENT OF NEPHROSTOMY CATHETER, INITIAL ENCOUNTER: ICD-10-CM

## 2017-03-30 PROCEDURE — 99291 CRITICAL CARE FIRST HOUR: CPT

## 2017-03-30 PROCEDURE — 50395: CPT | Mod: LT

## 2017-03-30 PROCEDURE — 99223 1ST HOSP IP/OBS HIGH 75: CPT | Mod: GC

## 2017-03-30 PROCEDURE — 71010: CPT | Mod: 26

## 2017-03-30 PROCEDURE — 99497 ADVNCD CARE PLAN 30 MIN: CPT | Mod: 25

## 2017-03-30 PROCEDURE — 99223 1ST HOSP IP/OBS HIGH 75: CPT

## 2017-03-30 RX ORDER — SODIUM CHLORIDE 9 MG/ML
2000 INJECTION INTRAMUSCULAR; INTRAVENOUS; SUBCUTANEOUS ONCE
Qty: 0 | Refills: 0 | Status: COMPLETED | OUTPATIENT
Start: 2017-03-30 | End: 2017-03-30

## 2017-03-30 RX ORDER — VANCOMYCIN HCL 1 G
750 VIAL (EA) INTRAVENOUS EVERY 12 HOURS
Qty: 0 | Refills: 0 | Status: DISCONTINUED | OUTPATIENT
Start: 2017-03-30 | End: 2017-03-30

## 2017-03-30 RX ORDER — POLYETHYLENE GLYCOL 3350 17 G/17G
17 POWDER, FOR SOLUTION ORAL DAILY
Qty: 0 | Refills: 0 | Status: DISCONTINUED | OUTPATIENT
Start: 2017-03-30 | End: 2017-03-30

## 2017-03-30 RX ORDER — ACETAMINOPHEN 500 MG
1000 TABLET ORAL ONCE
Qty: 0 | Refills: 0 | Status: COMPLETED | OUTPATIENT
Start: 2017-03-30 | End: 2017-03-30

## 2017-03-30 RX ORDER — PANTOPRAZOLE SODIUM 20 MG/1
40 TABLET, DELAYED RELEASE ORAL DAILY
Qty: 0 | Refills: 0 | Status: DISCONTINUED | OUTPATIENT
Start: 2017-03-30 | End: 2017-04-03

## 2017-03-30 RX ORDER — SODIUM CHLORIDE 9 MG/ML
1000 INJECTION INTRAMUSCULAR; INTRAVENOUS; SUBCUTANEOUS ONCE
Qty: 0 | Refills: 0 | Status: COMPLETED | OUTPATIENT
Start: 2017-03-30 | End: 2017-03-30

## 2017-03-30 RX ORDER — HYDROMORPHONE HYDROCHLORIDE 2 MG/ML
1 INJECTION INTRAMUSCULAR; INTRAVENOUS; SUBCUTANEOUS
Qty: 0 | Refills: 0 | Status: DISCONTINUED | OUTPATIENT
Start: 2017-03-30 | End: 2017-03-31

## 2017-03-30 RX ORDER — HYDROCORTISONE 20 MG
100 TABLET ORAL EVERY 8 HOURS
Qty: 0 | Refills: 0 | Status: DISCONTINUED | OUTPATIENT
Start: 2017-03-30 | End: 2017-03-31

## 2017-03-30 RX ORDER — HEPARIN SODIUM 5000 [USP'U]/ML
5000 INJECTION INTRAVENOUS; SUBCUTANEOUS EVERY 8 HOURS
Qty: 0 | Refills: 0 | Status: DISCONTINUED | OUTPATIENT
Start: 2017-03-30 | End: 2017-03-30

## 2017-03-30 RX ORDER — MEROPENEM 1 G/30ML
1000 INJECTION INTRAVENOUS ONCE
Qty: 0 | Refills: 0 | Status: DISCONTINUED | OUTPATIENT
Start: 2017-03-30 | End: 2017-03-30

## 2017-03-30 RX ORDER — PROCHLORPERAZINE MALEATE 5 MG
10 TABLET ORAL
Qty: 0 | Refills: 0 | COMMUNITY

## 2017-03-30 RX ORDER — ACETAMINOPHEN 500 MG
650 TABLET ORAL EVERY 6 HOURS
Qty: 0 | Refills: 0 | Status: DISCONTINUED | OUTPATIENT
Start: 2017-03-30 | End: 2017-03-30

## 2017-03-30 RX ORDER — ACETAMINOPHEN 500 MG
650 TABLET ORAL EVERY 6 HOURS
Qty: 0 | Refills: 0 | Status: DISCONTINUED | OUTPATIENT
Start: 2017-03-30 | End: 2017-03-31

## 2017-03-30 RX ORDER — HYDROMORPHONE HYDROCHLORIDE 2 MG/ML
0.5 INJECTION INTRAMUSCULAR; INTRAVENOUS; SUBCUTANEOUS
Qty: 100 | Refills: 0 | Status: DISCONTINUED | OUTPATIENT
Start: 2017-03-30 | End: 2017-03-31

## 2017-03-30 RX ORDER — ROBINUL 0.2 MG/ML
0.4 INJECTION INTRAMUSCULAR; INTRAVENOUS EVERY 4 HOURS
Qty: 0 | Refills: 0 | Status: DISCONTINUED | OUTPATIENT
Start: 2017-03-30 | End: 2017-04-07

## 2017-03-30 RX ORDER — PANTOPRAZOLE SODIUM 20 MG/1
40 TABLET, DELAYED RELEASE ORAL
Qty: 0 | Refills: 0 | Status: DISCONTINUED | OUTPATIENT
Start: 2017-03-30 | End: 2017-03-30

## 2017-03-30 RX ORDER — SODIUM CHLORIDE 9 MG/ML
1000 INJECTION INTRAMUSCULAR; INTRAVENOUS; SUBCUTANEOUS
Qty: 0 | Refills: 0 | Status: DISCONTINUED | OUTPATIENT
Start: 2017-03-30 | End: 2017-04-07

## 2017-03-30 RX ORDER — HYDROMORPHONE HYDROCHLORIDE 2 MG/ML
1 INJECTION INTRAMUSCULAR; INTRAVENOUS; SUBCUTANEOUS
Qty: 0 | Refills: 0 | Status: DISCONTINUED | OUTPATIENT
Start: 2017-03-30 | End: 2017-03-30

## 2017-03-30 RX ORDER — GABAPENTIN 400 MG/1
400 CAPSULE ORAL AT BEDTIME
Qty: 0 | Refills: 0 | Status: DISCONTINUED | OUTPATIENT
Start: 2017-03-30 | End: 2017-03-30

## 2017-03-30 RX ORDER — SENNA PLUS 8.6 MG/1
10 TABLET ORAL
Qty: 0 | Refills: 0 | Status: DISCONTINUED | OUTPATIENT
Start: 2017-03-30 | End: 2017-03-30

## 2017-03-30 RX ADMIN — Medication 250 MILLIGRAM(S): at 02:29

## 2017-03-30 RX ADMIN — SODIUM CHLORIDE 2000 MILLILITER(S): 9 INJECTION INTRAMUSCULAR; INTRAVENOUS; SUBCUTANEOUS at 01:06

## 2017-03-30 RX ADMIN — SODIUM CHLORIDE 100 MILLILITER(S): 9 INJECTION INTRAMUSCULAR; INTRAVENOUS; SUBCUTANEOUS at 12:05

## 2017-03-30 RX ADMIN — HYDROMORPHONE HYDROCHLORIDE 1 MILLIGRAM(S): 2 INJECTION INTRAMUSCULAR; INTRAVENOUS; SUBCUTANEOUS at 22:47

## 2017-03-30 RX ADMIN — Medication 100 MILLIGRAM(S): at 18:20

## 2017-03-30 RX ADMIN — SODIUM CHLORIDE 100 MILLILITER(S): 9 INJECTION INTRAMUSCULAR; INTRAVENOUS; SUBCUTANEOUS at 16:15

## 2017-03-30 RX ADMIN — SENNA PLUS 10 MILLILITER(S): 8.6 TABLET ORAL at 12:05

## 2017-03-30 RX ADMIN — Medication 150 MILLIGRAM(S): at 14:18

## 2017-03-30 RX ADMIN — Medication 400 MILLIGRAM(S): at 09:11

## 2017-03-30 RX ADMIN — Medication 100 MILLIGRAM(S): at 23:23

## 2017-03-30 RX ADMIN — MEROPENEM 200 MILLIGRAM(S): 1 INJECTION INTRAVENOUS at 22:47

## 2017-03-30 RX ADMIN — MEROPENEM 200 MILLIGRAM(S): 1 INJECTION INTRAVENOUS at 00:55

## 2017-03-30 RX ADMIN — SODIUM CHLORIDE 1000 MILLILITER(S): 9 INJECTION INTRAMUSCULAR; INTRAVENOUS; SUBCUTANEOUS at 08:19

## 2017-03-30 RX ADMIN — MEROPENEM 200 MILLIGRAM(S): 1 INJECTION INTRAVENOUS at 18:19

## 2017-03-30 RX ADMIN — HYDROMORPHONE HYDROCHLORIDE 0.5 MG/HR: 2 INJECTION INTRAMUSCULAR; INTRAVENOUS; SUBCUTANEOUS at 16:15

## 2017-03-30 RX ADMIN — HYDROMORPHONE HYDROCHLORIDE 1 MILLIGRAM(S): 2 INJECTION INTRAMUSCULAR; INTRAVENOUS; SUBCUTANEOUS at 23:17

## 2017-03-30 RX ADMIN — Medication 650 MILLIGRAM(S): at 08:29

## 2017-03-30 RX ADMIN — PANTOPRAZOLE SODIUM 40 MILLIGRAM(S): 20 TABLET, DELAYED RELEASE ORAL at 12:05

## 2017-03-30 RX ADMIN — SODIUM CHLORIDE 1000 MILLILITER(S): 9 INJECTION INTRAMUSCULAR; INTRAVENOUS; SUBCUTANEOUS at 10:52

## 2017-03-30 RX ADMIN — MEROPENEM 200 MILLIGRAM(S): 1 INJECTION INTRAVENOUS at 06:22

## 2017-03-30 RX ADMIN — Medication 100 MILLIGRAM(S): at 08:21

## 2017-03-30 NOTE — H&P ADULT. - PROBLEM SELECTOR PLAN 4
- Likely 2/2 decreased PO intake vs. SIADH  - S/p 3L NS in ED  - Will reassess BMP  - Consider urine studies - Patient with POD on CT A/P: neoplastic extension into the small bowel, worsened pulmonary metastatic disease, New hepatic metastatic disease. Increase in size in mesenteric and bilateral pelvic sidewall   - Patient on home hospice, will re-address GOC in AM  - On dilaudid PCA at home, palliative care consult to reinstate, will give IV dilaudid as needed   lymphadenopathy.

## 2017-03-30 NOTE — H&P ADULT. - ATTENDING COMMENTS
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  Daughter reports that initially the patient was less interactive at home.  History obtained from daughter in attendance.  No HA, no focal weakness.  Patient with reported chronic LEFT leg oedema. 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  Daughter reports that initially the patient was less interactive at home.  History obtained from daughter in attendance.  No HA, no focal weakness.  Patient with reported chronic LEFT leg oedema.  No HA, no focal weakness.   No chest pain/pressure.  No abdominal pain.  Remaining review of systems not contributory.  No tobacco or ethanol history.  Family history not contributory.  Daughter/proxy in attendance has rescinded patient's prior DNR.  Physical exam with an elderly emaciated F, alert, interactive with daughter in attendance in patient's native Mandarin.  .8.  systolic BP now 88 to 105 Torr.  HEENT, PERRL, EOMI, bitemporal wasting.  neck supple, chest poor effort, cor s1 s2, abdomen soft, scaphoid, normal bowel sounds, non-tender, Ext with LEFT leg oedema.  Neurologic exam as above--alert, oriented to person/place.  interacts with daughter and follows simple commands, moves ext x 4. 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  Daughter reports that initially the patient was less interactive at home.  History obtained from daughter in attendance.  No HA, no focal weakness.  Patient with reported chronic LEFT leg oedema.  No HA, no focal weakness.   No chest pain/pressure.  No abdominal pain.  Remaining review of systems not contributory.  No tobacco or ethanol history.  Family history not contributory.  Daughter/proxy in attendance has rescinded patient's prior DNR.  Physical exam with an elderly emaciated F, alert, interactive with daughter in attendance in patient's native Mandarin.  .8.  systolic BP now 88 to 105 Torr.  HEENT, PERRL, EOMI, bitemporal wasting.  neck supple, chest poor effort, cor s1 s2, abdomen soft, scaphoid, normal bowel sounds, non-tender, Ext with LEFT leg oedema.  Neurologic exam as above--alert, oriented to person/place.  interacts with daughter and follows simple commands, moves ext x 4.  WBC 10.5 with 88% N and 3%bands.  Cr 0.9.  Severe hypoalbuminemia with 3.0.  U/A turbid with no WBC.  Chest radiograph reviewed with bilateral pulmonary nodules.  CTT abdomen with interval removal of LEFT nephrostomy (now replaced by IR)  with disseminated carcinomatosis.  Needs an EKG.  Seen by MICU and NOT accepted to the MICU.  Would continue with broad spectrum IV antibiotics, stress dose steroids.  Would consider Doppler of the LEFT leg.  Spoke with the palliative care service in the ER who will see patient.  Would assume aspiration risk until more alert.  Patient with septic shock with presumed urinary source despite non-dx U/A.  Prognosis is poor.  Emotional support provide to patient/ daughter.  Daughter/proxy in attendance aware of course and agrees with plan/care as above.  Care assumed by the DAY HOSPITALIST.

## 2017-03-30 NOTE — H&P ADULT. - COMMENTS
unable to obtain 2/2 poor mental status  per daughter: fever to 102, confusion, pain in abdomen and spine

## 2017-03-30 NOTE — H&P ADULT. - HISTORY OF PRESENT ILLNESS
76 F Mandarin-speaking PMH Stage 4 colon Ca with mets to LN, uterus, and possibly lung s/p partial colectomy with Carmine procedure (done in [place] in 2014), c/b enterocolic fistula, b/l nephrostomy tubes (done in 2015), on home hospice ([facility]), recent admission 2/23-3/1 for SBO presenting for L nephrostomy tube pulled out and fever. History obtained from daughter 2/2 patient's mental status. Per daughter, she was turning her mother with assistance yesterday afternoon and noticed that her L nephrostomy tube was pulled out.  She also stated that she had a fever at home to 102F and was having low BPs to 105 systolic. She also stated that her mother was more confused and not responding the way she normally does. Daughter called hospice, who advised her that there was nothing to do at this time, but daughter called EMS.  Patient unable to provide ROS 2/2 mental status.     ED Course:  100.8, , /60, RR 16, SpO2 97% 2L NC.   IR was consulted, patient had L nephrostomy tube replaced. Treated with vanco/ meropenem and 3L NS.  MICU consulted for severe sepsis/ hypotension, patient not MICU candidate at this time.

## 2017-03-30 NOTE — ED ADULT NURSE REASSESSMENT NOTE - NS ED NURSE REASSESS COMMENT FT1
Patient back from Interventional radiology; Micu at bedside; patient responsive to pain and verbal stimulation; awaiting disposition at this time

## 2017-03-30 NOTE — PROVIDER CONTACT NOTE (OTHER) - BACKGROUND
Pt. admitted for sepsis. Pt. received a gram of Tylenol at 2300. Due for antibiotics at 1400 (Vanco and Meropenem)

## 2017-03-30 NOTE — H&P ADULT. - PROBLEM SELECTOR PLAN 5
- DVT ppx: HSQ - Likely 2/2 decreased PO intake vs. SIADH  - S/p 3L NS in ED  - Will reassess BMP  - Consider urine studies

## 2017-03-30 NOTE — H&P ADULT. - ASSESSMENT
76 F Mandarin-speaking PMH Stage 4 colon Ca with mets to LN, uterus, and possibly lung s/p partial colectomy with Carmine procedure (done in [place] in 2014), c/b enterocolic fistula, b/l nephrostomy tubes a/w severe sepsis likely 2/2 pyelo 2/2 dislodged nephrostomy tube.

## 2017-03-30 NOTE — H&P ADULT. - RADIOLOGY RESULTS AND INTERPRETATION
ct reviewed, L hydrouretronephrosis, POD ct reviewed, L hydrouretronephrosis, POD  CXR prelim read: Diffuse  bilateral  nodular  opacities,  worsened  from  prior  study  dated 2/18/2017.  Findings  suggestive  of  progression  of  metastatic  disease.  An underlying  pneumonia  cannot  be  excluded.

## 2017-03-30 NOTE — H&P ADULT. - PROBLEM SELECTOR PLAN 7
- Attempted to assess GOC with patient's daughter. Daughter states that she wants to speak to her mother about DNR/DNI today. Per prior charts/hospice records, patient is DNR/DNI. Will reassess and rescind DNR at this time.   - Palliative consult

## 2017-03-30 NOTE — H&P ADULT. - PROBLEM SELECTOR PLAN 6
- Attempted to assess GOC with patient's daughter. Daughter states that she wants to speak to her mother about DNR/DNI today. Per prior charts/hospice records, patient is DNR/DNI. Will reassess  - Palliative consult - DVT ppx: HSQ

## 2017-03-30 NOTE — PATIENT PROFILE ADULT. - NUTRITION PROFILE
nausea/vomiting greater or equal to 3 days prior to admit/unintentional weight loss no indicators present

## 2017-03-30 NOTE — H&P ADULT. - PROBLEM SELECTOR PLAN 3
- Patient with POD on CT A/P: neoplastic extension into the small bowel, worsened pulmonary metastatic disease, New hepatic metastatic disease. Increase in size in mesenteric and bilateral pelvic sidewall   - Patient on home hospice, will re-address GOC in AM  - On dilaudid PCA at home, palliative care consult to reinstate, will give IV dilaudid as needed   lymphadenopathy. - likely 2/2 sepsis related, plan as above  - consider CT head if indicated

## 2017-03-30 NOTE — H&P ADULT. - PROBLEM SELECTOR PLAN 1
- Patient with severe sepsis (hypotension, tachycardia, AMS) likely 2/2 pyelo 2/2 moderate left hydroureteronephrosis seen on CT A/P  - S/p Vanc/naye in ED, will c/w broad spectrum abx  - MICU consult in ED, not candidate at this time  - C/w IVF hydration, consider albumin and stress dose steroids/midodrine if patient remains hypotensive  - Bcx sent, will follow  - UA w/ large LE, follow-up Ucx  - ?Underlying PNA on CXR, O2 as needed

## 2017-03-31 LAB
-  AMIKACIN: SIGNIFICANT CHANGE UP
-  AMIKACIN: SIGNIFICANT CHANGE UP
-  AMPICILLIN/SULBACTAM: SIGNIFICANT CHANGE UP
-  AMPICILLIN: SIGNIFICANT CHANGE UP
-  AZTREONAM: SIGNIFICANT CHANGE UP
-  AZTREONAM: SIGNIFICANT CHANGE UP
-  CEFAZOLIN: SIGNIFICANT CHANGE UP
-  CEFEPIME: SIGNIFICANT CHANGE UP
-  CEFEPIME: SIGNIFICANT CHANGE UP
-  CEFOXITIN: SIGNIFICANT CHANGE UP
-  CEFTAZIDIME: SIGNIFICANT CHANGE UP
-  CEFTAZIDIME: SIGNIFICANT CHANGE UP
-  CEFTRIAXONE: SIGNIFICANT CHANGE UP
-  CIPROFLOXACIN: SIGNIFICANT CHANGE UP
-  CIPROFLOXACIN: SIGNIFICANT CHANGE UP
-  ERTAPENEM: SIGNIFICANT CHANGE UP
-  GENTAMICIN: SIGNIFICANT CHANGE UP
-  GENTAMICIN: SIGNIFICANT CHANGE UP
-  IMIPENEM: SIGNIFICANT CHANGE UP
-  LEVOFLOXACIN: SIGNIFICANT CHANGE UP
-  LEVOFLOXACIN: SIGNIFICANT CHANGE UP
-  MEROPENEM: SIGNIFICANT CHANGE UP
-  MEROPENEM: SIGNIFICANT CHANGE UP
-  NITROFURANTOIN: SIGNIFICANT CHANGE UP
-  PIPERACILLIN/TAZOBACTAM: SIGNIFICANT CHANGE UP
-  PIPERACILLIN/TAZOBACTAM: SIGNIFICANT CHANGE UP
-  TOBRAMYCIN: SIGNIFICANT CHANGE UP
-  TOBRAMYCIN: SIGNIFICANT CHANGE UP
-  TRIMETHOPRIM/SULFAMETHOXAZOLE: SIGNIFICANT CHANGE UP
CULTURE RESULTS: NO GROWTH — SIGNIFICANT CHANGE UP
CULTURE RESULTS: SIGNIFICANT CHANGE UP
METHOD TYPE: SIGNIFICANT CHANGE UP
METHOD TYPE: SIGNIFICANT CHANGE UP
ORGANISM # SPEC MICROSCOPIC CNT: SIGNIFICANT CHANGE UP
SPECIMEN SOURCE: SIGNIFICANT CHANGE UP
SPECIMEN SOURCE: SIGNIFICANT CHANGE UP

## 2017-03-31 PROCEDURE — 99233 SBSQ HOSP IP/OBS HIGH 50: CPT | Mod: GC

## 2017-03-31 RX ORDER — SENNA PLUS 8.6 MG/1
2 TABLET ORAL AT BEDTIME
Qty: 0 | Refills: 0 | Status: DISCONTINUED | OUTPATIENT
Start: 2017-03-31 | End: 2017-04-07

## 2017-03-31 RX ORDER — ONDANSETRON 8 MG/1
4 TABLET, FILM COATED ORAL ONCE
Qty: 0 | Refills: 0 | Status: COMPLETED | OUTPATIENT
Start: 2017-03-31 | End: 2017-03-31

## 2017-03-31 RX ORDER — DOCUSATE SODIUM 100 MG
100 CAPSULE ORAL
Qty: 0 | Refills: 0 | Status: DISCONTINUED | OUTPATIENT
Start: 2017-03-31 | End: 2017-04-01

## 2017-03-31 RX ORDER — POLYETHYLENE GLYCOL 3350 17 G/17G
17 POWDER, FOR SOLUTION ORAL DAILY
Qty: 0 | Refills: 0 | Status: DISCONTINUED | OUTPATIENT
Start: 2017-03-31 | End: 2017-04-03

## 2017-03-31 RX ORDER — HYDROCORTISONE 20 MG
100 TABLET ORAL EVERY 12 HOURS
Qty: 0 | Refills: 0 | Status: DISCONTINUED | OUTPATIENT
Start: 2017-03-31 | End: 2017-03-31

## 2017-03-31 RX ORDER — ACETAMINOPHEN 500 MG
650 TABLET ORAL EVERY 6 HOURS
Qty: 0 | Refills: 0 | Status: DISCONTINUED | OUTPATIENT
Start: 2017-03-31 | End: 2017-04-07

## 2017-03-31 RX ORDER — HYDROMORPHONE HYDROCHLORIDE 2 MG/ML
1 INJECTION INTRAMUSCULAR; INTRAVENOUS; SUBCUTANEOUS ONCE
Qty: 0 | Refills: 0 | Status: DISCONTINUED | OUTPATIENT
Start: 2017-03-31 | End: 2017-03-31

## 2017-03-31 RX ORDER — HYDROMORPHONE HYDROCHLORIDE 2 MG/ML
30 INJECTION INTRAMUSCULAR; INTRAVENOUS; SUBCUTANEOUS
Qty: 0 | Refills: 0 | Status: DISCONTINUED | OUTPATIENT
Start: 2017-03-31 | End: 2017-04-04

## 2017-03-31 RX ORDER — GABAPENTIN 400 MG/1
400 CAPSULE ORAL AT BEDTIME
Qty: 0 | Refills: 0 | Status: DISCONTINUED | OUTPATIENT
Start: 2017-03-31 | End: 2017-04-07

## 2017-03-31 RX ORDER — SIMETHICONE 80 MG/1
80 TABLET, CHEWABLE ORAL ONCE
Qty: 0 | Refills: 0 | Status: COMPLETED | OUTPATIENT
Start: 2017-03-31 | End: 2017-03-31

## 2017-03-31 RX ADMIN — SODIUM CHLORIDE 100 MILLILITER(S): 9 INJECTION INTRAMUSCULAR; INTRAVENOUS; SUBCUTANEOUS at 04:31

## 2017-03-31 RX ADMIN — PANTOPRAZOLE SODIUM 40 MILLIGRAM(S): 20 TABLET, DELAYED RELEASE ORAL at 14:38

## 2017-03-31 RX ADMIN — MEROPENEM 200 MILLIGRAM(S): 1 INJECTION INTRAVENOUS at 14:37

## 2017-03-31 RX ADMIN — Medication 0.5 MILLIGRAM(S): at 05:09

## 2017-03-31 RX ADMIN — HYDROMORPHONE HYDROCHLORIDE 1 MILLIGRAM(S): 2 INJECTION INTRAMUSCULAR; INTRAVENOUS; SUBCUTANEOUS at 04:54

## 2017-03-31 RX ADMIN — SENNA PLUS 2 TABLET(S): 8.6 TABLET ORAL at 21:18

## 2017-03-31 RX ADMIN — HYDROMORPHONE HYDROCHLORIDE 1 MILLIGRAM(S): 2 INJECTION INTRAMUSCULAR; INTRAVENOUS; SUBCUTANEOUS at 04:24

## 2017-03-31 RX ADMIN — HYDROMORPHONE HYDROCHLORIDE 30 MILLILITER(S): 2 INJECTION INTRAMUSCULAR; INTRAVENOUS; SUBCUTANEOUS at 19:22

## 2017-03-31 RX ADMIN — HYDROMORPHONE HYDROCHLORIDE 0.5 MG/HR: 2 INJECTION INTRAMUSCULAR; INTRAVENOUS; SUBCUTANEOUS at 07:22

## 2017-03-31 RX ADMIN — Medication 100 MILLIGRAM(S): at 05:06

## 2017-03-31 RX ADMIN — HYDROMORPHONE HYDROCHLORIDE 30 MILLILITER(S): 2 INJECTION INTRAMUSCULAR; INTRAVENOUS; SUBCUTANEOUS at 12:55

## 2017-03-31 RX ADMIN — MEROPENEM 200 MILLIGRAM(S): 1 INJECTION INTRAVENOUS at 05:06

## 2017-03-31 RX ADMIN — POLYETHYLENE GLYCOL 3350 17 GRAM(S): 17 POWDER, FOR SOLUTION ORAL at 20:08

## 2017-03-31 RX ADMIN — HYDROMORPHONE HYDROCHLORIDE 1 MILLIGRAM(S): 2 INJECTION INTRAMUSCULAR; INTRAVENOUS; SUBCUTANEOUS at 12:22

## 2017-03-31 RX ADMIN — HYDROMORPHONE HYDROCHLORIDE 1 MILLIGRAM(S): 2 INJECTION INTRAMUSCULAR; INTRAVENOUS; SUBCUTANEOUS at 08:20

## 2017-03-31 RX ADMIN — ONDANSETRON 4 MILLIGRAM(S): 8 TABLET, FILM COATED ORAL at 06:13

## 2017-03-31 RX ADMIN — GABAPENTIN 400 MILLIGRAM(S): 400 CAPSULE ORAL at 21:18

## 2017-03-31 RX ADMIN — HYDROMORPHONE HYDROCHLORIDE 1 MILLIGRAM(S): 2 INJECTION INTRAMUSCULAR; INTRAVENOUS; SUBCUTANEOUS at 12:37

## 2017-03-31 RX ADMIN — Medication 100 MILLIGRAM(S): at 18:02

## 2017-03-31 RX ADMIN — MEROPENEM 200 MILLIGRAM(S): 1 INJECTION INTRAVENOUS at 21:18

## 2017-03-31 RX ADMIN — SIMETHICONE 80 MILLIGRAM(S): 80 TABLET, CHEWABLE ORAL at 06:13

## 2017-04-01 PROCEDURE — 99233 SBSQ HOSP IP/OBS HIGH 50: CPT

## 2017-04-01 RX ORDER — DEXAMETHASONE 0.5 MG/5ML
4 ELIXIR ORAL
Qty: 0 | Refills: 0 | Status: DISCONTINUED | OUTPATIENT
Start: 2017-04-01 | End: 2017-04-07

## 2017-04-01 RX ORDER — SIMETHICONE 80 MG/1
80 TABLET, CHEWABLE ORAL EVERY 8 HOURS
Qty: 0 | Refills: 0 | Status: DISCONTINUED | OUTPATIENT
Start: 2017-04-01 | End: 2017-04-07

## 2017-04-01 RX ORDER — DOCUSATE SODIUM 100 MG
100 CAPSULE ORAL
Qty: 0 | Refills: 0 | Status: DISCONTINUED | OUTPATIENT
Start: 2017-04-01 | End: 2017-04-07

## 2017-04-01 RX ADMIN — MEROPENEM 200 MILLIGRAM(S): 1 INJECTION INTRAVENOUS at 13:06

## 2017-04-01 RX ADMIN — Medication 4 MILLIGRAM(S): at 17:12

## 2017-04-01 RX ADMIN — MEROPENEM 200 MILLIGRAM(S): 1 INJECTION INTRAVENOUS at 22:43

## 2017-04-01 RX ADMIN — Medication 100 MILLIGRAM(S): at 17:12

## 2017-04-01 RX ADMIN — PANTOPRAZOLE SODIUM 40 MILLIGRAM(S): 20 TABLET, DELAYED RELEASE ORAL at 13:06

## 2017-04-01 RX ADMIN — Medication 0.5 MILLIGRAM(S): at 19:58

## 2017-04-01 RX ADMIN — Medication 0.5 MILLIGRAM(S): at 14:47

## 2017-04-01 RX ADMIN — SIMETHICONE 80 MILLIGRAM(S): 80 TABLET, CHEWABLE ORAL at 19:58

## 2017-04-01 RX ADMIN — MEROPENEM 200 MILLIGRAM(S): 1 INJECTION INTRAVENOUS at 06:19

## 2017-04-01 RX ADMIN — POLYETHYLENE GLYCOL 3350 17 GRAM(S): 17 POWDER, FOR SOLUTION ORAL at 10:32

## 2017-04-01 RX ADMIN — HYDROMORPHONE HYDROCHLORIDE 30 MILLILITER(S): 2 INJECTION INTRAMUSCULAR; INTRAVENOUS; SUBCUTANEOUS at 07:21

## 2017-04-01 RX ADMIN — HYDROMORPHONE HYDROCHLORIDE 30 MILLILITER(S): 2 INJECTION INTRAMUSCULAR; INTRAVENOUS; SUBCUTANEOUS at 19:16

## 2017-04-01 RX ADMIN — Medication 100 MILLIGRAM(S): at 06:19

## 2017-04-01 RX ADMIN — SIMETHICONE 80 MILLIGRAM(S): 80 TABLET, CHEWABLE ORAL at 10:32

## 2017-04-01 RX ADMIN — HYDROMORPHONE HYDROCHLORIDE 30 MILLILITER(S): 2 INJECTION INTRAMUSCULAR; INTRAVENOUS; SUBCUTANEOUS at 08:41

## 2017-04-02 PROCEDURE — 99233 SBSQ HOSP IP/OBS HIGH 50: CPT

## 2017-04-02 RX ORDER — METHYLNALTREXONE BROMIDE 12 MG/.6ML
12 INJECTION, SOLUTION SUBCUTANEOUS ONCE
Qty: 0 | Refills: 0 | Status: COMPLETED | OUTPATIENT
Start: 2017-04-02 | End: 2017-04-02

## 2017-04-02 RX ADMIN — MEROPENEM 200 MILLIGRAM(S): 1 INJECTION INTRAVENOUS at 05:30

## 2017-04-02 RX ADMIN — SODIUM CHLORIDE 20 MILLILITER(S): 9 INJECTION INTRAMUSCULAR; INTRAVENOUS; SUBCUTANEOUS at 07:09

## 2017-04-02 RX ADMIN — MEROPENEM 200 MILLIGRAM(S): 1 INJECTION INTRAVENOUS at 21:57

## 2017-04-02 RX ADMIN — HYDROMORPHONE HYDROCHLORIDE 30 MILLILITER(S): 2 INJECTION INTRAMUSCULAR; INTRAVENOUS; SUBCUTANEOUS at 19:03

## 2017-04-02 RX ADMIN — MEROPENEM 200 MILLIGRAM(S): 1 INJECTION INTRAVENOUS at 13:08

## 2017-04-02 RX ADMIN — Medication 100 MILLIGRAM(S): at 05:30

## 2017-04-02 RX ADMIN — SENNA PLUS 2 TABLET(S): 8.6 TABLET ORAL at 21:56

## 2017-04-02 RX ADMIN — Medication 0.5 MILLIGRAM(S): at 17:52

## 2017-04-02 RX ADMIN — Medication 4 MILLIGRAM(S): at 17:38

## 2017-04-02 RX ADMIN — PANTOPRAZOLE SODIUM 40 MILLIGRAM(S): 20 TABLET, DELAYED RELEASE ORAL at 11:25

## 2017-04-02 RX ADMIN — HYDROMORPHONE HYDROCHLORIDE 30 MILLILITER(S): 2 INJECTION INTRAMUSCULAR; INTRAVENOUS; SUBCUTANEOUS at 07:09

## 2017-04-02 RX ADMIN — GABAPENTIN 400 MILLIGRAM(S): 400 CAPSULE ORAL at 21:57

## 2017-04-02 RX ADMIN — METHYLNALTREXONE BROMIDE 12 MILLIGRAM(S): 12 INJECTION, SOLUTION SUBCUTANEOUS at 17:38

## 2017-04-02 RX ADMIN — Medication 4 MILLIGRAM(S): at 05:30

## 2017-04-02 RX ADMIN — HYDROMORPHONE HYDROCHLORIDE 30 MILLILITER(S): 2 INJECTION INTRAMUSCULAR; INTRAVENOUS; SUBCUTANEOUS at 11:25

## 2017-04-02 RX ADMIN — Medication 100 MILLIGRAM(S): at 17:38

## 2017-04-03 PROCEDURE — 99233 SBSQ HOSP IP/OBS HIGH 50: CPT | Mod: GC

## 2017-04-03 RX ORDER — POLYETHYLENE GLYCOL 3350 17 G/17G
17 POWDER, FOR SOLUTION ORAL
Qty: 0 | Refills: 0 | Status: DISCONTINUED | OUTPATIENT
Start: 2017-04-03 | End: 2017-04-07

## 2017-04-03 RX ORDER — PANTOPRAZOLE SODIUM 20 MG/1
40 TABLET, DELAYED RELEASE ORAL
Qty: 0 | Refills: 0 | Status: DISCONTINUED | OUTPATIENT
Start: 2017-04-03 | End: 2017-04-07

## 2017-04-03 RX ORDER — POLYETHYLENE GLYCOL 3350 17 G/17G
17 POWDER, FOR SOLUTION ORAL DAILY
Qty: 0 | Refills: 0 | Status: DISCONTINUED | OUTPATIENT
Start: 2017-04-03 | End: 2017-04-03

## 2017-04-03 RX ADMIN — SODIUM CHLORIDE 20 MILLILITER(S): 9 INJECTION INTRAMUSCULAR; INTRAVENOUS; SUBCUTANEOUS at 19:16

## 2017-04-03 RX ADMIN — GABAPENTIN 400 MILLIGRAM(S): 400 CAPSULE ORAL at 21:17

## 2017-04-03 RX ADMIN — HYDROMORPHONE HYDROCHLORIDE 30 MILLILITER(S): 2 INJECTION INTRAMUSCULAR; INTRAVENOUS; SUBCUTANEOUS at 19:04

## 2017-04-03 RX ADMIN — Medication 100 MILLIGRAM(S): at 05:44

## 2017-04-03 RX ADMIN — Medication 4 MILLIGRAM(S): at 05:44

## 2017-04-03 RX ADMIN — HYDROMORPHONE HYDROCHLORIDE 30 MILLILITER(S): 2 INJECTION INTRAMUSCULAR; INTRAVENOUS; SUBCUTANEOUS at 07:09

## 2017-04-03 RX ADMIN — MEROPENEM 200 MILLIGRAM(S): 1 INJECTION INTRAVENOUS at 13:37

## 2017-04-03 RX ADMIN — SENNA PLUS 2 TABLET(S): 8.6 TABLET ORAL at 21:17

## 2017-04-03 RX ADMIN — HYDROMORPHONE HYDROCHLORIDE 30 MILLILITER(S): 2 INJECTION INTRAMUSCULAR; INTRAVENOUS; SUBCUTANEOUS at 10:05

## 2017-04-03 RX ADMIN — PANTOPRAZOLE SODIUM 40 MILLIGRAM(S): 20 TABLET, DELAYED RELEASE ORAL at 12:20

## 2017-04-03 RX ADMIN — Medication 4 MILLIGRAM(S): at 17:18

## 2017-04-03 RX ADMIN — MEROPENEM 200 MILLIGRAM(S): 1 INJECTION INTRAVENOUS at 05:44

## 2017-04-03 RX ADMIN — SIMETHICONE 80 MILLIGRAM(S): 80 TABLET, CHEWABLE ORAL at 08:53

## 2017-04-03 RX ADMIN — Medication 100 MILLIGRAM(S): at 17:18

## 2017-04-03 RX ADMIN — MEROPENEM 200 MILLIGRAM(S): 1 INJECTION INTRAVENOUS at 21:16

## 2017-04-04 PROCEDURE — 93970 EXTREMITY STUDY: CPT | Mod: 26

## 2017-04-04 PROCEDURE — 76700 US EXAM ABDOM COMPLETE: CPT | Mod: 26

## 2017-04-04 PROCEDURE — 99233 SBSQ HOSP IP/OBS HIGH 50: CPT | Mod: GC

## 2017-04-04 RX ORDER — HYDROMORPHONE HYDROCHLORIDE 2 MG/ML
30 INJECTION INTRAMUSCULAR; INTRAVENOUS; SUBCUTANEOUS
Qty: 0 | Refills: 0 | Status: DISCONTINUED | OUTPATIENT
Start: 2017-04-04 | End: 2017-04-07

## 2017-04-04 RX ORDER — FUROSEMIDE 40 MG
40 TABLET ORAL ONCE
Qty: 0 | Refills: 0 | Status: COMPLETED | OUTPATIENT
Start: 2017-04-04 | End: 2017-04-04

## 2017-04-04 RX ADMIN — PANTOPRAZOLE SODIUM 40 MILLIGRAM(S): 20 TABLET, DELAYED RELEASE ORAL at 05:25

## 2017-04-04 RX ADMIN — HYDROMORPHONE HYDROCHLORIDE 30 MILLILITER(S): 2 INJECTION INTRAMUSCULAR; INTRAVENOUS; SUBCUTANEOUS at 08:09

## 2017-04-04 RX ADMIN — HYDROMORPHONE HYDROCHLORIDE 30 MILLILITER(S): 2 INJECTION INTRAMUSCULAR; INTRAVENOUS; SUBCUTANEOUS at 19:24

## 2017-04-04 RX ADMIN — POLYETHYLENE GLYCOL 3350 17 GRAM(S): 17 POWDER, FOR SOLUTION ORAL at 19:47

## 2017-04-04 RX ADMIN — MEROPENEM 200 MILLIGRAM(S): 1 INJECTION INTRAVENOUS at 14:49

## 2017-04-04 RX ADMIN — GABAPENTIN 400 MILLIGRAM(S): 400 CAPSULE ORAL at 21:14

## 2017-04-04 RX ADMIN — Medication 100 MILLIGRAM(S): at 17:10

## 2017-04-04 RX ADMIN — MEROPENEM 200 MILLIGRAM(S): 1 INJECTION INTRAVENOUS at 21:14

## 2017-04-04 RX ADMIN — Medication 4 MILLIGRAM(S): at 17:10

## 2017-04-04 RX ADMIN — Medication 0.5 MILLIGRAM(S): at 00:14

## 2017-04-04 RX ADMIN — SODIUM CHLORIDE 20 MILLILITER(S): 9 INJECTION INTRAMUSCULAR; INTRAVENOUS; SUBCUTANEOUS at 19:47

## 2017-04-04 RX ADMIN — HYDROMORPHONE HYDROCHLORIDE 30 MILLILITER(S): 2 INJECTION INTRAMUSCULAR; INTRAVENOUS; SUBCUTANEOUS at 07:22

## 2017-04-04 RX ADMIN — Medication 100 MILLIGRAM(S): at 05:25

## 2017-04-04 RX ADMIN — Medication 40 MILLIGRAM(S): at 12:26

## 2017-04-04 RX ADMIN — SENNA PLUS 2 TABLET(S): 8.6 TABLET ORAL at 21:14

## 2017-04-04 RX ADMIN — MEROPENEM 200 MILLIGRAM(S): 1 INJECTION INTRAVENOUS at 05:25

## 2017-04-04 RX ADMIN — Medication 4 MILLIGRAM(S): at 05:25

## 2017-04-04 RX ADMIN — POLYETHYLENE GLYCOL 3350 17 GRAM(S): 17 POWDER, FOR SOLUTION ORAL at 05:25

## 2017-04-05 LAB
ALBUMIN SERPL ELPH-MCNC: 2.4 G/DL — LOW (ref 3.3–5)
ALP SERPL-CCNC: 75 U/L — SIGNIFICANT CHANGE UP (ref 40–120)
ALT FLD-CCNC: 8 U/L — LOW (ref 10–45)
ANION GAP SERPL CALC-SCNC: 12 MMOL/L — SIGNIFICANT CHANGE UP (ref 5–17)
AST SERPL-CCNC: 18 U/L — SIGNIFICANT CHANGE UP (ref 10–40)
BILIRUB SERPL-MCNC: 0.3 MG/DL — SIGNIFICANT CHANGE UP (ref 0.2–1.2)
BUN SERPL-MCNC: 18 MG/DL — SIGNIFICANT CHANGE UP (ref 7–23)
CALCIUM SERPL-MCNC: 8.1 MG/DL — LOW (ref 8.4–10.5)
CHLORIDE SERPL-SCNC: 93 MMOL/L — LOW (ref 96–108)
CO2 SERPL-SCNC: 27 MMOL/L — SIGNIFICANT CHANGE UP (ref 22–31)
CREAT SERPL-MCNC: 0.62 MG/DL — SIGNIFICANT CHANGE UP (ref 0.5–1.3)
GLUCOSE SERPL-MCNC: 103 MG/DL — HIGH (ref 70–99)
HCT VFR BLD CALC: 27.9 % — LOW (ref 34.5–45)
HGB BLD-MCNC: 8.8 G/DL — LOW (ref 11.5–15.5)
MAGNESIUM SERPL-MCNC: 2.4 MG/DL — SIGNIFICANT CHANGE UP (ref 1.6–2.6)
MCHC RBC-ENTMCNC: 28.8 PG — SIGNIFICANT CHANGE UP (ref 27–34)
MCHC RBC-ENTMCNC: 31.5 GM/DL — LOW (ref 32–36)
MCV RBC AUTO: 91.2 FL — SIGNIFICANT CHANGE UP (ref 80–100)
PHOSPHATE SERPL-MCNC: 2.2 MG/DL — LOW (ref 2.5–4.5)
PLATELET # BLD AUTO: 301 K/UL — SIGNIFICANT CHANGE UP (ref 150–400)
POTASSIUM SERPL-MCNC: 3.5 MMOL/L — SIGNIFICANT CHANGE UP (ref 3.5–5.3)
POTASSIUM SERPL-SCNC: 3.5 MMOL/L — SIGNIFICANT CHANGE UP (ref 3.5–5.3)
PROT SERPL-MCNC: 5.1 G/DL — LOW (ref 6–8.3)
RBC # BLD: 3.06 M/UL — LOW (ref 3.8–5.2)
RBC # FLD: 15.6 % — HIGH (ref 10.3–14.5)
SODIUM SERPL-SCNC: 132 MMOL/L — LOW (ref 135–145)
WBC # BLD: 8.27 K/UL — SIGNIFICANT CHANGE UP (ref 3.8–10.5)
WBC # FLD AUTO: 8.27 K/UL — SIGNIFICANT CHANGE UP (ref 3.8–10.5)

## 2017-04-05 PROCEDURE — 99233 SBSQ HOSP IP/OBS HIGH 50: CPT | Mod: GC

## 2017-04-05 RX ORDER — POTASSIUM CHLORIDE 20 MEQ
20 PACKET (EA) ORAL ONCE
Qty: 0 | Refills: 0 | Status: DISCONTINUED | OUTPATIENT
Start: 2017-04-05 | End: 2017-04-05

## 2017-04-05 RX ORDER — FUROSEMIDE 40 MG
40 TABLET ORAL ONCE
Qty: 0 | Refills: 0 | Status: COMPLETED | OUTPATIENT
Start: 2017-04-05 | End: 2017-04-05

## 2017-04-05 RX ORDER — POTASSIUM PHOSPHATE, MONOBASIC POTASSIUM PHOSPHATE, DIBASIC 236; 224 MG/ML; MG/ML
15 INJECTION, SOLUTION INTRAVENOUS ONCE
Qty: 0 | Refills: 0 | Status: COMPLETED | OUTPATIENT
Start: 2017-04-05 | End: 2017-04-05

## 2017-04-05 RX ORDER — SODIUM,POTASSIUM PHOSPHATES 278-250MG
2 POWDER IN PACKET (EA) ORAL
Qty: 0 | Refills: 0 | Status: DISCONTINUED | OUTPATIENT
Start: 2017-04-05 | End: 2017-04-05

## 2017-04-05 RX ORDER — POTASSIUM CHLORIDE 20 MEQ
10 PACKET (EA) ORAL ONCE
Qty: 0 | Refills: 0 | Status: COMPLETED | OUTPATIENT
Start: 2017-04-05 | End: 2017-04-05

## 2017-04-05 RX ADMIN — Medication 100 MILLIGRAM(S): at 05:42

## 2017-04-05 RX ADMIN — Medication 100 MILLIGRAM(S): at 17:25

## 2017-04-05 RX ADMIN — POLYETHYLENE GLYCOL 3350 17 GRAM(S): 17 POWDER, FOR SOLUTION ORAL at 17:25

## 2017-04-05 RX ADMIN — POTASSIUM PHOSPHATE, MONOBASIC POTASSIUM PHOSPHATE, DIBASIC 62.5 MILLIMOLE(S): 236; 224 INJECTION, SOLUTION INTRAVENOUS at 11:30

## 2017-04-05 RX ADMIN — SODIUM CHLORIDE 20 MILLILITER(S): 9 INJECTION INTRAMUSCULAR; INTRAVENOUS; SUBCUTANEOUS at 07:18

## 2017-04-05 RX ADMIN — SENNA PLUS 2 TABLET(S): 8.6 TABLET ORAL at 21:57

## 2017-04-05 RX ADMIN — HYDROMORPHONE HYDROCHLORIDE 30 MILLILITER(S): 2 INJECTION INTRAMUSCULAR; INTRAVENOUS; SUBCUTANEOUS at 11:19

## 2017-04-05 RX ADMIN — HYDROMORPHONE HYDROCHLORIDE 30 MILLILITER(S): 2 INJECTION INTRAMUSCULAR; INTRAVENOUS; SUBCUTANEOUS at 18:38

## 2017-04-05 RX ADMIN — HYDROMORPHONE HYDROCHLORIDE 30 MILLILITER(S): 2 INJECTION INTRAMUSCULAR; INTRAVENOUS; SUBCUTANEOUS at 07:17

## 2017-04-05 RX ADMIN — POLYETHYLENE GLYCOL 3350 17 GRAM(S): 17 POWDER, FOR SOLUTION ORAL at 05:42

## 2017-04-05 RX ADMIN — MEROPENEM 200 MILLIGRAM(S): 1 INJECTION INTRAVENOUS at 21:57

## 2017-04-05 RX ADMIN — GABAPENTIN 400 MILLIGRAM(S): 400 CAPSULE ORAL at 21:57

## 2017-04-05 RX ADMIN — Medication 4 MILLIGRAM(S): at 17:25

## 2017-04-05 RX ADMIN — Medication 4 MILLIGRAM(S): at 05:42

## 2017-04-05 RX ADMIN — Medication 100 MILLIEQUIVALENT(S): at 10:26

## 2017-04-05 RX ADMIN — Medication 40 MILLIGRAM(S): at 10:26

## 2017-04-05 RX ADMIN — MEROPENEM 200 MILLIGRAM(S): 1 INJECTION INTRAVENOUS at 15:12

## 2017-04-05 RX ADMIN — PANTOPRAZOLE SODIUM 40 MILLIGRAM(S): 20 TABLET, DELAYED RELEASE ORAL at 05:42

## 2017-04-05 RX ADMIN — ROBINUL 0.4 MILLIGRAM(S): 0.2 INJECTION INTRAMUSCULAR; INTRAVENOUS at 08:13

## 2017-04-05 RX ADMIN — MEROPENEM 200 MILLIGRAM(S): 1 INJECTION INTRAVENOUS at 05:42

## 2017-04-05 NOTE — DIETITIAN INITIAL EVALUATION ADULT. - ORAL INTAKE PTA
fair/Per daughter, pt was eating fairly at home consuming 2-3 meals per day supplemented by 1-2 oral supplements (either ensure, smoothies, etc), daughter stated pt had been eating better when at home, Diet recall: breakfast: 2 eggs, banana, protein powder, Lunch:  protein, veggie, fruit and veggie juice, Dinner: small portion of meat and veggie soup.

## 2017-04-05 NOTE — DIETITIAN INITIAL EVALUATION ADULT. - ENERGY NEEDS
Pt seen for length of stay. Pt with PMH including stage IV colon cancer with mets to lymph node, uterus, ? lung S/P partial colectomy, corey procedure complicated by enterocolic fistula, dilshad nephrostomy tubes, recently found with progression of disease, overall poor prognosis on home hospice services. Pt admitted now with L nephrostomy tube pulled out and fever found to have severe sepsis likely due to pyelonephritis due to dislodged nephrostomy, plan for antibiotics and likely discharge home with hospice services.    Ht:5'2" (daughter stated) , Wt:100 lbs, BMI:18.3 kg/m2, IBW: 110lbs +/- 10%, %IBW: 91%  3+ dilshad leg, ankle, foot and arm edema, Skin noted with suspected deep tissue injury to lower back

## 2017-04-05 NOTE — DIETITIAN INITIAL EVALUATION ADULT. - NS AS NUTRI INTERV MEALS SNACK
1. Continue regular diet with ensure enlive 3 x daily, 2. Continue to encourage po intake and obtain/honor food preferences as able-reviewed alternative menu items available and obtained food preferences, 3. Monitor PO intake, diet tolerance, weight trends, labs, and skin integrity, 4. RD to remain available as needed

## 2017-04-05 NOTE — DIETITIAN INITIAL EVALUATION ADULT. - PROBLEM SELECTOR PLAN 4
- Patient with POD on CT A/P: neoplastic extension into the small bowel, worsened pulmonary metastatic disease, New hepatic metastatic disease. Increase in size in mesenteric and bilateral pelvic sidewall   - Patient on home hospice, will re-address GOC in AM  - On dilaudid PCA at home, palliative care consult to reinstate, will give IV dilaudid as needed   lymphadenopathy.

## 2017-04-05 NOTE — DIETITIAN INITIAL EVALUATION ADULT. - SOURCE
patient/Daughter at bedside, comprehensive chart review, previous RD notes/other (specify)/family/significant other

## 2017-04-05 NOTE — DIETITIAN INITIAL EVALUATION ADULT. - OTHER INFO
Daughter reports pt with significant, progressive weight loss over the past 1.5 years, stated pt used to weight ~200 lbs 1.5 years ago but over the course of this timeframe, weight has continued to decline, noted hospital weights of 137.5 lbs (11/2016), 122.3 lbs (12/2016, 2/23/2017, 3/30/2017-noted current admit weight was stated), ? accuracy of admit weight, daughter feels pt has been ~100 lbs and stated 122.3 lbs sounded too high. Pt with no N+V lately, daughter stated during the course of her bowel obstruction she felt nauseous. Last BM 4/4, daughter stated pt usually stools daily. Pt with no known food allergies, no micronutrient supplementation. No difficulty chewing/swallowing-noted pt  missing several teeth however daughter stated pt has been able to chew and swallow regular textured foods well-declined downgrading to softer texture diet. In house pt has been eating foods from hospital and home, mostly liquids and no solids, noted pt had ~1 ensure enlive today.

## 2017-04-06 ENCOUNTER — TRANSCRIPTION ENCOUNTER (OUTPATIENT)
Age: 77
End: 2017-04-06

## 2017-04-06 PROCEDURE — 99231 SBSQ HOSP IP/OBS SF/LOW 25: CPT

## 2017-04-06 PROCEDURE — 99233 SBSQ HOSP IP/OBS HIGH 50: CPT | Mod: GC

## 2017-04-06 RX ORDER — DOCUSATE SODIUM 100 MG
5 CAPSULE ORAL
Qty: 0 | Refills: 0 | COMMUNITY

## 2017-04-06 RX ORDER — PANTOPRAZOLE SODIUM 20 MG/1
1 TABLET, DELAYED RELEASE ORAL
Qty: 30 | Refills: 0 | OUTPATIENT
Start: 2017-04-06 | End: 2017-05-06

## 2017-04-06 RX ORDER — SENNA PLUS 8.6 MG/1
5 TABLET ORAL
Qty: 0 | Refills: 0 | COMMUNITY

## 2017-04-06 RX ORDER — POLYETHYLENE GLYCOL 3350 17 G/17G
17 POWDER, FOR SOLUTION ORAL
Qty: 1020 | Refills: 0 | OUTPATIENT
Start: 2017-04-06 | End: 2017-05-06

## 2017-04-06 RX ORDER — SIMETHICONE 80 MG/1
1 TABLET, CHEWABLE ORAL
Qty: 90 | Refills: 0 | OUTPATIENT
Start: 2017-04-06 | End: 2017-05-06

## 2017-04-06 RX ORDER — GABAPENTIN 400 MG/1
1 CAPSULE ORAL
Qty: 0 | Refills: 0 | COMMUNITY

## 2017-04-06 RX ORDER — GABAPENTIN 400 MG/1
200 CAPSULE ORAL ONCE
Qty: 0 | Refills: 0 | Status: COMPLETED | OUTPATIENT
Start: 2017-04-06 | End: 2017-04-06

## 2017-04-06 RX ORDER — HYDROMORPHONE HYDROCHLORIDE 2 MG/ML
2 INJECTION INTRAMUSCULAR; INTRAVENOUS; SUBCUTANEOUS
Qty: 96 | Refills: 0 | OUTPATIENT
Start: 2017-04-06

## 2017-04-06 RX ORDER — DOCUSATE SODIUM 100 MG
1 CAPSULE ORAL
Qty: 60 | Refills: 0 | OUTPATIENT
Start: 2017-04-06 | End: 2017-05-06

## 2017-04-06 RX ORDER — SENNA PLUS 8.6 MG/1
2 TABLET ORAL
Qty: 60 | Refills: 0 | OUTPATIENT
Start: 2017-04-06 | End: 2017-05-06

## 2017-04-06 RX ORDER — ATROPINE SULFATE 1 %
1 DROPS OPHTHALMIC (EYE)
Qty: 1 | Refills: 0 | OUTPATIENT
Start: 2017-04-06 | End: 2017-05-06

## 2017-04-06 RX ORDER — GABAPENTIN 400 MG/1
1 CAPSULE ORAL
Qty: 30 | Refills: 0 | OUTPATIENT
Start: 2017-04-06 | End: 2017-05-06

## 2017-04-06 RX ORDER — DEXAMETHASONE 0.5 MG/5ML
1 ELIXIR ORAL
Qty: 60 | Refills: 0 | OUTPATIENT
Start: 2017-04-06 | End: 2017-05-06

## 2017-04-06 RX ORDER — DEXAMETHASONE 0.5 MG/5ML
1 ELIXIR ORAL
Qty: 0 | Refills: 0 | COMMUNITY

## 2017-04-06 RX ADMIN — Medication 4 MILLIGRAM(S): at 05:45

## 2017-04-06 RX ADMIN — Medication 4 MILLIGRAM(S): at 18:17

## 2017-04-06 RX ADMIN — HYDROMORPHONE HYDROCHLORIDE 30 MILLILITER(S): 2 INJECTION INTRAMUSCULAR; INTRAVENOUS; SUBCUTANEOUS at 09:22

## 2017-04-06 RX ADMIN — GABAPENTIN 400 MILLIGRAM(S): 400 CAPSULE ORAL at 21:30

## 2017-04-06 RX ADMIN — PANTOPRAZOLE SODIUM 40 MILLIGRAM(S): 20 TABLET, DELAYED RELEASE ORAL at 06:11

## 2017-04-06 RX ADMIN — SIMETHICONE 80 MILLIGRAM(S): 80 TABLET, CHEWABLE ORAL at 00:02

## 2017-04-06 RX ADMIN — HYDROMORPHONE HYDROCHLORIDE 30 MILLILITER(S): 2 INJECTION INTRAMUSCULAR; INTRAVENOUS; SUBCUTANEOUS at 06:12

## 2017-04-06 RX ADMIN — HYDROMORPHONE HYDROCHLORIDE 30 MILLILITER(S): 2 INJECTION INTRAMUSCULAR; INTRAVENOUS; SUBCUTANEOUS at 19:06

## 2017-04-06 RX ADMIN — SIMETHICONE 80 MILLIGRAM(S): 80 TABLET, CHEWABLE ORAL at 11:35

## 2017-04-06 RX ADMIN — GABAPENTIN 200 MILLIGRAM(S): 400 CAPSULE ORAL at 16:30

## 2017-04-06 RX ADMIN — MEROPENEM 200 MILLIGRAM(S): 1 INJECTION INTRAVENOUS at 05:45

## 2017-04-06 RX ADMIN — POLYETHYLENE GLYCOL 3350 17 GRAM(S): 17 POWDER, FOR SOLUTION ORAL at 05:45

## 2017-04-06 RX ADMIN — Medication 100 MILLIGRAM(S): at 05:45

## 2017-04-06 NOTE — DISCHARGE NOTE ADULT - CARE PLAN
Principal Discharge DX:	Colon cancer  Goal:	No current therapies available. Supportive care with hospice services.  Instructions for follow-up, activity and diet:	Continue supportive management with hospice services.  Secondary Diagnosis:	Severe sepsis  Instructions for follow-up, activity and diet:	Completed 7 day course of IV antibiotics for UTI.  Secondary Diagnosis:	Nephrostomy tube displaced  Instructions for follow-up, activity and diet:	Nephrostomy tube replaced. Some leakage with dressing changes PRN.

## 2017-04-06 NOTE — DISCHARGE NOTE ADULT - MEDICATION SUMMARY - MEDICATIONS TO TAKE
I will START or STAY ON the medications listed below when I get home from the hospital:    dexamethasone 4 mg oral tablet  -- 1 tab(s) by mouth 2 times a day  -- Indication: For Malignancy     Dilaudid 2 mg/mL injectable solution  -- Dispense 5 300ml bags. Basal rate 1mg q1hour. Bolus dose 1mg q20 minutes PRN. Max 3 boluses in 1 hour. MDD 96  -- Caution federal law prohibits the transfer of this drug to any person other  than the person for whom it was prescribed.  May cause drowsiness.  Alcohol may intensify this effect.  Use care when operating dangerous machinery.  This prescription cannot be refilled.    -- Indication: For Pain    Ativan 0.5 mg oral tablet  -- 1 tab(s) by mouth every hour, As Needed -for agitation MDD:12 mg  -- Caution federal law prohibits the transfer of this drug to any person other  than the person for whom it was prescribed.  Do not take this drug if you are pregnant.  May cause drowsiness.  Alcohol may intensify this effect.  Use care when operating dangerous machinery.    -- Indication: For Anxiety    gabapentin 400 mg oral capsule  -- 1 cap(s) by mouth once a day (at bedtime)  -- Indication: For Pain    bisacodyl 10 mg rectal suppository  -- 1 suppository(ies) rectally once a day, As needed, Constipation  -- Indication: For Constipation    docusate sodium 100 mg oral capsule  -- 1 cap(s) by mouth 2 times a day  -- Indication: For Constipation    polyethylene glycol 3350 oral powder for reconstitution  -- 17 gram(s) by mouth 2 times a day  -- Indication: For Constipation    senna oral tablet  -- 2 tab(s) by mouth once a day (at bedtime)  -- Indication: For Constipation    simethicone 80 mg oral tablet, chewable  -- 1 tab(s) by mouth every 8 hours, As needed, Gas  -- Indication: For Gas    atropine ophthalmic 1% ophthalmic solution  -- 1 drop(s) to each affected eye every 4 hours, As Needed for secretions   -- For the eye.    -- Indication: For Secretions    pantoprazole 40 mg oral delayed release tablet  -- 1 tab(s) by mouth once a day (before a meal)  -- Indication: For Abdominal pain I will START or STAY ON the medications listed below when I get home from the hospital:    dexamethasone 4 mg oral tablet  -- 1 tab(s) by mouth 2 times a day  -- Indication: For pain    Dilaudid 2 mg/mL injectable solution  -- Dispense 5 300ml bags. Basal rate 1mg q1hour. Bolus dose 1mg q20 minutes PRN. Max 3 boluses in 1 hour. MDD 96  -- Caution federal law prohibits the transfer of this drug to any person other  than the person for whom it was prescribed.  May cause drowsiness.  Alcohol may intensify this effect.  Use care when operating dangerous machinery.  This prescription cannot be refilled.    -- Indication: For Pain    gabapentin 300 mg oral capsule  -- 1 cap(s) by mouth 3 times a day MDD:900 mgs  -- Indication: For pain    gabapentin 100 mg oral capsule  -- 100 milligram(s) by mouth 2 times a day, As Needed pain MDD:200 mgs  -- Indication: For pain    Ativan 0.5 mg oral tablet  -- 1 tab(s) by mouth every hour, As Needed -for agitation MDD:12 mg  -- Caution federal law prohibits the transfer of this drug to any person other  than the person for whom it was prescribed.  Do not take this drug if you are pregnant.  May cause drowsiness.  Alcohol may intensify this effect.  Use care when operating dangerous machinery.    -- Indication: For anxiety    bisacodyl 10 mg rectal suppository  -- 1 suppository(ies) rectally once a day, As needed, Constipation  -- Indication: For Constipation    docusate sodium 100 mg oral capsule  -- 1 cap(s) by mouth 2 times a day  -- Indication: For Stool softner    polyethylene glycol 3350 oral powder for reconstitution  -- 17 gram(s) by mouth 2 times a day  -- Indication: For Constipation    senna oral tablet  -- 2 tab(s) by mouth once a day (at bedtime)  -- Indication: For lasative    simethicone 80 mg oral tablet, chewable  -- 1 tab(s) by mouth every 8 hours, As needed, Gas  -- Indication: For Gas    atropine ophthalmic 1% ophthalmic solution  -- 1 drop(s) to each affected eye every 4 hours, As Needed for secretions  -- For the eye.    -- Indication: For Secretions    pantoprazole 40 mg oral delayed release tablet  -- 1 tab(s) by mouth once a day (before a meal)  -- Indication: For GERD

## 2017-04-06 NOTE — DISCHARGE NOTE ADULT - HOSPITAL COURSE
76 year old female with hx of metastatic colon cancer on home hospice presents with nephrostomy tube dislodgement and altered mental status. She was septic in the ED and fluid resuscitated. She was treated empirically for UTI with meropenem. Given the acuity of the situation and the desire of the family for aggressive intervention the patient was discharged from hospice and admitted to PCU. She was found to have a Psuedonomas and Proteus UTI and completed a 7 day course of meropenem completed on 4/5/17. She was evaluated by IR and had the nephrostomy tube replaced. During the course of her hospitalization she was noted to have constipation resolved with 1 dose of relistor and now daily with current bowel regimen. She was also noted to have diffuse ansarca and given two days of lasix to help with fluid management. Abdominal US and LE dopplers revealed although there is no evidence of deep vein thrombosis in the visualized venous segments of either lower extremity, there is loss of phasicity in the waveforms obtained from the left lower extremity consistent with proximal obstruction, either from compression or thrombosis of the left common iliac or external iliac veins, possibly by known pelvic mass. There were ongoing goals of care discussions with patients daughter about her prognosis. Patient hemodynamically stable for discharge home with hospice services. 76 year old female with hx of metastatic colon cancer on home hospice presents with nephrostomy tube dislodgement and altered mental status. She was septic in the ED and fluid resuscitated. She was treated empirically for UTI with meropenem. Given the acuity of the situation and the desire of the family for aggressive intervention the patient was discharged from hospice and admitted to PCU. She was found to have a Psuedonomas and Proteus UTI and completed a 7 day course of meropenem completed on 4/5/17. She was evaluated by IR and had the nephrostomy tube replaced. During the course of her hospitalization she was noted to have constipation resolved with 1 dose of relistor and now daily with current bowel regimen. She was also noted to have diffuse ansarca and given two days of lasix to help with fluid management. Abdominal US and LE dopplers revealed although there is no evidence of deep vein thrombosis in the visualized venous segments of either lower extremity, there is loss of phasicity in the waveforms obtained from the left lower extremity consistent with proximal obstruction, either from compression or thrombosis of the left common iliac or external iliac veins, possibly by known pelvic mass. There were ongoing goals of care discussions with patients daughter about her prognosis. Patient hemodynamically stable for discharge home with hospice services on 4/7/17.

## 2017-04-06 NOTE — DISCHARGE NOTE ADULT - PLAN OF CARE
No current therapies available. Supportive care with hospice services. Continue supportive management with hospice services. Completed 7 day course of IV antibiotics for UTI. Nephrostomy tube replaced. Some leakage with dressing changes PRN.

## 2017-04-06 NOTE — DISCHARGE NOTE ADULT - MEDICATION SUMMARY - MEDICATIONS TO STOP TAKING
I will STOP taking the medications listed below when I get home from the hospital:    Compazine  -- 10 milligram(s) ORAL DISINTEGRATING TAB by mouth every 6 hours, As Needed    senna oral liquid  -- 10 milliliter(s) by mouth twice    Colace 10 mg/mL oral liquid  -- 10 milliliter(s) by mouth 2 times a day    hydromorphone PCA pump  --  Intravenously 1mg/hr, patient can use every 20 minutes    docusate sodium 10 mg/mL oral liquid  -- 5 milliliter(s) by mouth 3 times a day    senna oral liquid  -- 5 milliliter(s) by mouth 2 times a day

## 2017-04-06 NOTE — DISCHARGE NOTE ADULT - MEDICATION SUMMARY - MEDICATIONS TO CHANGE
I will SWITCH the dose or number of times a day I take the medications listed below when I get home from the hospital:    Dilaudid 2 mg/mL injectable solution  -- dilaudid PCA conc 2mg/mL  disp: five 350-mL bags  basal: 1mg/hour  bolus: 1mg every 20 minutes  max: 3 boluses/h  MDD 96mg  -- Caution federal law prohibits the transfer of this drug to any person other  than the person for whom it was prescribed.  May cause drowsiness.  Alcohol may intensify this effect.  Use care when operating dangerous machinery.  This prescription cannot be refilled.    polyethylene glycol 3350 oral powder for reconstitution  -- 17 gram(s) by mouth 3 times a day, As Needed -Constipation I will SWITCH the dose or number of times a day I take the medications listed below when I get home from the hospital:    Dilaudid 2 mg/mL injectable solution  -- dilaudid PCA conc 2mg/mL  disp: five 350-mL bags  basal: 1mg/hour  bolus: 1mg every 20 minutes  max: 3 boluses/h  MDD 96mg  -- Caution federal law prohibits the transfer of this drug to any person other  than the person for whom it was prescribed.  May cause drowsiness.  Alcohol may intensify this effect.  Use care when operating dangerous machinery.  This prescription cannot be refilled.    gabapentin 400 mg oral tablet  -- 1 cap(s) by mouth once a day (at bedtime)    polyethylene glycol 3350 oral powder for reconstitution  -- 17 gram(s) by mouth 3 times a day, As Needed -Constipation

## 2017-04-06 NOTE — DISCHARGE NOTE ADULT - PATIENT PORTAL LINK FT
“You can access the [facility] Patient Portal, offered by [facility], by registering with the following website: [email or web address]”

## 2017-04-07 VITALS
OXYGEN SATURATION: 91 % | HEART RATE: 90 BPM | DIASTOLIC BLOOD PRESSURE: 60 MMHG | RESPIRATION RATE: 16 BRPM | SYSTOLIC BLOOD PRESSURE: 92 MMHG | TEMPERATURE: 98 F

## 2017-04-07 PROCEDURE — 87086 URINE CULTURE/COLONY COUNT: CPT

## 2017-04-07 PROCEDURE — 74176 CT ABD & PELVIS W/O CONTRAST: CPT

## 2017-04-07 PROCEDURE — 93970 EXTREMITY STUDY: CPT

## 2017-04-07 PROCEDURE — 50395: CPT

## 2017-04-07 PROCEDURE — 82947 ASSAY GLUCOSE BLOOD QUANT: CPT

## 2017-04-07 PROCEDURE — 83735 ASSAY OF MAGNESIUM: CPT

## 2017-04-07 PROCEDURE — 82435 ASSAY OF BLOOD CHLORIDE: CPT

## 2017-04-07 PROCEDURE — 81001 URINALYSIS AUTO W/SCOPE: CPT

## 2017-04-07 PROCEDURE — 84132 ASSAY OF SERUM POTASSIUM: CPT

## 2017-04-07 PROCEDURE — 87040 BLOOD CULTURE FOR BACTERIA: CPT

## 2017-04-07 PROCEDURE — 83605 ASSAY OF LACTIC ACID: CPT

## 2017-04-07 PROCEDURE — 71045 X-RAY EXAM CHEST 1 VIEW: CPT

## 2017-04-07 PROCEDURE — 84100 ASSAY OF PHOSPHORUS: CPT

## 2017-04-07 PROCEDURE — 87186 SC STD MICRODIL/AGAR DIL: CPT

## 2017-04-07 PROCEDURE — C1729: CPT

## 2017-04-07 PROCEDURE — 85014 HEMATOCRIT: CPT

## 2017-04-07 PROCEDURE — C1887: CPT

## 2017-04-07 PROCEDURE — 85730 THROMBOPLASTIN TIME PARTIAL: CPT

## 2017-04-07 PROCEDURE — 99238 HOSP IP/OBS DSCHRG MGMT 30/<: CPT

## 2017-04-07 PROCEDURE — 85027 COMPLETE CBC AUTOMATED: CPT

## 2017-04-07 PROCEDURE — 80053 COMPREHEN METABOLIC PANEL: CPT

## 2017-04-07 PROCEDURE — 85610 PROTHROMBIN TIME: CPT

## 2017-04-07 PROCEDURE — 82330 ASSAY OF CALCIUM: CPT

## 2017-04-07 PROCEDURE — 84295 ASSAY OF SERUM SODIUM: CPT

## 2017-04-07 PROCEDURE — 76700 US EXAM ABDOM COMPLETE: CPT

## 2017-04-07 PROCEDURE — C1769: CPT

## 2017-04-07 PROCEDURE — 96374 THER/PROPH/DIAG INJ IV PUSH: CPT

## 2017-04-07 PROCEDURE — 82803 BLOOD GASES ANY COMBINATION: CPT

## 2017-04-07 PROCEDURE — 99285 EMERGENCY DEPT VISIT HI MDM: CPT | Mod: 25

## 2017-04-07 RX ORDER — GABAPENTIN 400 MG/1
1 CAPSULE ORAL
Qty: 90 | Refills: 0 | OUTPATIENT
Start: 2017-04-07 | End: 2017-05-07

## 2017-04-07 RX ORDER — GABAPENTIN 400 MG/1
100 CAPSULE ORAL
Qty: 6000 | Refills: 0 | OUTPATIENT
Start: 2017-04-07 | End: 2017-05-07

## 2017-04-07 RX ORDER — GABAPENTIN 400 MG/1
300 CAPSULE ORAL THREE TIMES A DAY
Qty: 0 | Refills: 0 | Status: DISCONTINUED | OUTPATIENT
Start: 2017-04-07 | End: 2017-04-07

## 2017-04-07 RX ORDER — GABAPENTIN 400 MG/1
100 CAPSULE ORAL
Qty: 0 | Refills: 0 | Status: DISCONTINUED | OUTPATIENT
Start: 2017-04-07 | End: 2017-04-07

## 2017-04-07 RX ADMIN — Medication 100 MILLIGRAM(S): at 06:17

## 2017-04-07 RX ADMIN — HYDROMORPHONE HYDROCHLORIDE 30 MILLILITER(S): 2 INJECTION INTRAMUSCULAR; INTRAVENOUS; SUBCUTANEOUS at 07:26

## 2017-04-07 RX ADMIN — POLYETHYLENE GLYCOL 3350 17 GRAM(S): 17 POWDER, FOR SOLUTION ORAL at 06:17

## 2017-04-07 RX ADMIN — Medication 4 MILLIGRAM(S): at 06:17

## 2017-04-07 RX ADMIN — HYDROMORPHONE HYDROCHLORIDE 30 MILLILITER(S): 2 INJECTION INTRAMUSCULAR; INTRAVENOUS; SUBCUTANEOUS at 03:29

## 2017-04-07 RX ADMIN — PANTOPRAZOLE SODIUM 40 MILLIGRAM(S): 20 TABLET, DELAYED RELEASE ORAL at 06:17

## 2017-04-07 RX ADMIN — GABAPENTIN 300 MILLIGRAM(S): 400 CAPSULE ORAL at 12:36

## 2017-04-21 ENCOUNTER — INPATIENT (INPATIENT)
Facility: HOSPITAL | Age: 77
LOS: 16 days | Discharge: HOME | DRG: 374 | End: 2017-05-08
Attending: FAMILY MEDICINE | Admitting: HOSPITALIST
Payer: MEDICAID

## 2017-04-21 VITALS
DIASTOLIC BLOOD PRESSURE: 95 MMHG | RESPIRATION RATE: 18 BRPM | SYSTOLIC BLOOD PRESSURE: 143 MMHG | OXYGEN SATURATION: 94 % | HEART RATE: 101 BPM

## 2017-04-21 DIAGNOSIS — M79.89 OTHER SPECIFIED SOFT TISSUE DISORDERS: ICD-10-CM

## 2017-04-21 DIAGNOSIS — K56.69 OTHER INTESTINAL OBSTRUCTION: ICD-10-CM

## 2017-04-21 DIAGNOSIS — I82.409 ACUTE EMBOLISM AND THROMBOSIS OF UNSPECIFIED DEEP VEINS OF UNSPECIFIED LOWER EXTREMITY: ICD-10-CM

## 2017-04-21 DIAGNOSIS — G89.3 NEOPLASM RELATED PAIN (ACUTE) (CHRONIC): ICD-10-CM

## 2017-04-21 DIAGNOSIS — Z87.448 PERSONAL HISTORY OF OTHER DISEASES OF URINARY SYSTEM: Chronic | ICD-10-CM

## 2017-04-21 DIAGNOSIS — I26.99 OTHER PULMONARY EMBOLISM WITHOUT ACUTE COR PULMONALE: ICD-10-CM

## 2017-04-21 DIAGNOSIS — C18.9 MALIGNANT NEOPLASM OF COLON, UNSPECIFIED: ICD-10-CM

## 2017-04-21 DIAGNOSIS — Z93.3 COLOSTOMY STATUS: Chronic | ICD-10-CM

## 2017-04-21 DIAGNOSIS — Z98.890 OTHER SPECIFIED POSTPROCEDURAL STATES: Chronic | ICD-10-CM

## 2017-04-21 DIAGNOSIS — R10.9 UNSPECIFIED ABDOMINAL PAIN: ICD-10-CM

## 2017-04-21 LAB
ALBUMIN SERPL ELPH-MCNC: 3 G/DL — LOW (ref 3.3–5)
ALP SERPL-CCNC: 125 U/L — HIGH (ref 40–120)
ALT FLD-CCNC: 17 U/L RC — SIGNIFICANT CHANGE UP (ref 10–45)
ANION GAP SERPL CALC-SCNC: 17 MMOL/L — SIGNIFICANT CHANGE UP (ref 5–17)
APPEARANCE UR: ABNORMAL
AST SERPL-CCNC: 23 U/L — SIGNIFICANT CHANGE UP (ref 10–40)
BACTERIA # UR AUTO: ABNORMAL /HPF
BASOPHILS # BLD AUTO: 0 K/UL — SIGNIFICANT CHANGE UP (ref 0–0.2)
BASOPHILS NFR BLD AUTO: 0.1 % — SIGNIFICANT CHANGE UP (ref 0–2)
BILIRUB SERPL-MCNC: 0.3 MG/DL — SIGNIFICANT CHANGE UP (ref 0.2–1.2)
BILIRUB UR-MCNC: NEGATIVE — SIGNIFICANT CHANGE UP
BUN SERPL-MCNC: 20 MG/DL — SIGNIFICANT CHANGE UP (ref 7–23)
CALCIUM SERPL-MCNC: 8.8 MG/DL — SIGNIFICANT CHANGE UP (ref 8.4–10.5)
CHLORIDE SERPL-SCNC: 92 MMOL/L — LOW (ref 96–108)
CO2 SERPL-SCNC: 23 MMOL/L — SIGNIFICANT CHANGE UP (ref 22–31)
COLOR SPEC: YELLOW — SIGNIFICANT CHANGE UP
CREAT SERPL-MCNC: 0.69 MG/DL — SIGNIFICANT CHANGE UP (ref 0.5–1.3)
DIFF PNL FLD: ABNORMAL
EOSINOPHIL # BLD AUTO: 0.1 K/UL — SIGNIFICANT CHANGE UP (ref 0–0.5)
EOSINOPHIL NFR BLD AUTO: 0.7 % — SIGNIFICANT CHANGE UP (ref 0–6)
EPI CELLS # UR: SIGNIFICANT CHANGE UP /HPF
GLUCOSE SERPL-MCNC: 122 MG/DL — HIGH (ref 70–99)
GLUCOSE UR QL: NEGATIVE — SIGNIFICANT CHANGE UP
HCT VFR BLD CALC: 31 % — LOW (ref 34.5–45)
HGB BLD-MCNC: 10.3 G/DL — LOW (ref 11.5–15.5)
KETONES UR-MCNC: NEGATIVE — SIGNIFICANT CHANGE UP
LEUKOCYTE ESTERASE UR-ACNC: ABNORMAL
LYMPHOCYTES # BLD AUTO: 0.7 K/UL — LOW (ref 1–3.3)
LYMPHOCYTES # BLD AUTO: 4.2 % — LOW (ref 13–44)
MAGNESIUM SERPL-MCNC: 2.7 MG/DL — HIGH (ref 1.6–2.6)
MCHC RBC-ENTMCNC: 30.1 PG — SIGNIFICANT CHANGE UP (ref 27–34)
MCHC RBC-ENTMCNC: 33.3 GM/DL — SIGNIFICANT CHANGE UP (ref 32–36)
MCV RBC AUTO: 90.5 FL — SIGNIFICANT CHANGE UP (ref 80–100)
MONOCYTES # BLD AUTO: 0.7 K/UL — SIGNIFICANT CHANGE UP (ref 0–0.9)
MONOCYTES NFR BLD AUTO: 4 % — SIGNIFICANT CHANGE UP (ref 2–14)
NEUTROPHILS # BLD AUTO: 15 K/UL — HIGH (ref 1.8–7.4)
NEUTROPHILS NFR BLD AUTO: 91 % — HIGH (ref 43–77)
NITRITE UR-MCNC: POSITIVE
PH UR: 7 — SIGNIFICANT CHANGE UP (ref 5–8)
PHOSPHATE SERPL-MCNC: 3.5 MG/DL — SIGNIFICANT CHANGE UP (ref 2.5–4.5)
PLATELET # BLD AUTO: 417 K/UL — HIGH (ref 150–400)
POTASSIUM SERPL-MCNC: 4.5 MMOL/L — SIGNIFICANT CHANGE UP (ref 3.5–5.3)
POTASSIUM SERPL-SCNC: 4.5 MMOL/L — SIGNIFICANT CHANGE UP (ref 3.5–5.3)
PROT SERPL-MCNC: 6.5 G/DL — SIGNIFICANT CHANGE UP (ref 6–8.3)
PROT UR-MCNC: 100 MG/DL
RBC # BLD: 3.43 M/UL — LOW (ref 3.8–5.2)
RBC # FLD: 15 % — HIGH (ref 10.3–14.5)
RBC CASTS # UR COMP ASSIST: ABNORMAL /HPF (ref 0–2)
SODIUM SERPL-SCNC: 132 MMOL/L — LOW (ref 135–145)
SP GR SPEC: 1.02 — SIGNIFICANT CHANGE UP (ref 1.01–1.02)
UROBILINOGEN FLD QL: NEGATIVE — SIGNIFICANT CHANGE UP
WBC # BLD: 16.5 K/UL — HIGH (ref 3.8–10.5)
WBC # FLD AUTO: 16.5 K/UL — HIGH (ref 3.8–10.5)
WBC UR QL: SIGNIFICANT CHANGE UP /HPF (ref 0–5)

## 2017-04-21 PROCEDURE — 99223 1ST HOSP IP/OBS HIGH 75: CPT

## 2017-04-21 PROCEDURE — 93971 EXTREMITY STUDY: CPT | Mod: 26

## 2017-04-21 PROCEDURE — 99498 ADVNCD CARE PLAN ADDL 30 MIN: CPT | Mod: 25

## 2017-04-21 PROCEDURE — 99497 ADVNCD CARE PLAN 30 MIN: CPT | Mod: 25

## 2017-04-21 PROCEDURE — 74020: CPT | Mod: 26

## 2017-04-21 PROCEDURE — 99285 EMERGENCY DEPT VISIT HI MDM: CPT | Mod: 25

## 2017-04-21 PROCEDURE — 74176 CT ABD & PELVIS W/O CONTRAST: CPT | Mod: 26

## 2017-04-21 RX ORDER — HYDROMORPHONE HYDROCHLORIDE 2 MG/ML
2 INJECTION INTRAMUSCULAR; INTRAVENOUS; SUBCUTANEOUS
Qty: 0 | Refills: 0 | Status: DISCONTINUED | OUTPATIENT
Start: 2017-04-21 | End: 2017-04-26

## 2017-04-21 RX ORDER — SODIUM CHLORIDE 9 MG/ML
1000 INJECTION, SOLUTION INTRAVENOUS
Qty: 0 | Refills: 0 | Status: DISCONTINUED | OUTPATIENT
Start: 2017-04-21 | End: 2017-04-22

## 2017-04-21 RX ORDER — ENOXAPARIN SODIUM 100 MG/ML
40 INJECTION SUBCUTANEOUS EVERY 24 HOURS
Qty: 0 | Refills: 0 | Status: DISCONTINUED | OUTPATIENT
Start: 2017-04-21 | End: 2017-05-08

## 2017-04-21 RX ORDER — ATROPINE SULFATE 1 %
1 DROPS OPHTHALMIC (EYE) EVERY 4 HOURS
Qty: 0 | Refills: 0 | Status: DISCONTINUED | OUTPATIENT
Start: 2017-04-21 | End: 2017-05-08

## 2017-04-21 RX ORDER — OCTREOTIDE ACETATE 200 UG/ML
25 INJECTION, SOLUTION INTRAVENOUS; SUBCUTANEOUS
Qty: 500 | Refills: 0 | Status: DISCONTINUED | OUTPATIENT
Start: 2017-04-21 | End: 2017-05-02

## 2017-04-21 RX ORDER — ONDANSETRON 8 MG/1
4 TABLET, FILM COATED ORAL EVERY 8 HOURS
Qty: 0 | Refills: 0 | Status: DISCONTINUED | OUTPATIENT
Start: 2017-04-21 | End: 2017-05-04

## 2017-04-21 RX ORDER — ONDANSETRON 8 MG/1
4 TABLET, FILM COATED ORAL ONCE
Qty: 0 | Refills: 0 | Status: COMPLETED | OUTPATIENT
Start: 2017-04-21 | End: 2017-04-21

## 2017-04-21 RX ORDER — ACETAMINOPHEN 500 MG
650 TABLET ORAL EVERY 6 HOURS
Qty: 0 | Refills: 0 | Status: DISCONTINUED | OUTPATIENT
Start: 2017-04-21 | End: 2017-05-08

## 2017-04-21 RX ORDER — SODIUM CHLORIDE 9 MG/ML
1000 INJECTION INTRAMUSCULAR; INTRAVENOUS; SUBCUTANEOUS ONCE
Qty: 0 | Refills: 0 | Status: COMPLETED | OUTPATIENT
Start: 2017-04-21 | End: 2017-04-21

## 2017-04-21 RX ORDER — ONDANSETRON 8 MG/1
4 TABLET, FILM COATED ORAL EVERY 6 HOURS
Qty: 0 | Refills: 0 | Status: DISCONTINUED | OUTPATIENT
Start: 2017-04-21 | End: 2017-04-21

## 2017-04-21 RX ORDER — HYDROMORPHONE HYDROCHLORIDE 2 MG/ML
30 INJECTION INTRAMUSCULAR; INTRAVENOUS; SUBCUTANEOUS
Qty: 0 | Refills: 0 | Status: DISCONTINUED | OUTPATIENT
Start: 2017-04-21 | End: 2017-04-26

## 2017-04-21 RX ORDER — HYDROMORPHONE HYDROCHLORIDE 2 MG/ML
1 INJECTION INTRAMUSCULAR; INTRAVENOUS; SUBCUTANEOUS
Qty: 0 | Refills: 0 | Status: DISCONTINUED | OUTPATIENT
Start: 2017-04-21 | End: 2017-04-24

## 2017-04-21 RX ORDER — DEXAMETHASONE 0.5 MG/5ML
4 ELIXIR ORAL EVERY 12 HOURS
Qty: 0 | Refills: 0 | Status: DISCONTINUED | OUTPATIENT
Start: 2017-04-21 | End: 2017-05-04

## 2017-04-21 RX ADMIN — ENOXAPARIN SODIUM 40 MILLIGRAM(S): 100 INJECTION SUBCUTANEOUS at 18:44

## 2017-04-21 RX ADMIN — HYDROMORPHONE HYDROCHLORIDE 1 MILLIGRAM(S): 2 INJECTION INTRAMUSCULAR; INTRAVENOUS; SUBCUTANEOUS at 14:28

## 2017-04-21 RX ADMIN — HYDROMORPHONE HYDROCHLORIDE 1 MILLIGRAM(S): 2 INJECTION INTRAMUSCULAR; INTRAVENOUS; SUBCUTANEOUS at 14:50

## 2017-04-21 RX ADMIN — HYDROMORPHONE HYDROCHLORIDE 30 MILLILITER(S): 2 INJECTION INTRAMUSCULAR; INTRAVENOUS; SUBCUTANEOUS at 18:16

## 2017-04-21 RX ADMIN — SODIUM CHLORIDE 1000 MILLILITER(S): 9 INJECTION INTRAMUSCULAR; INTRAVENOUS; SUBCUTANEOUS at 07:04

## 2017-04-21 RX ADMIN — ONDANSETRON 4 MILLIGRAM(S): 8 TABLET, FILM COATED ORAL at 14:29

## 2017-04-21 RX ADMIN — HYDROMORPHONE HYDROCHLORIDE 30 MILLILITER(S): 2 INJECTION INTRAMUSCULAR; INTRAVENOUS; SUBCUTANEOUS at 19:36

## 2017-04-21 RX ADMIN — ONDANSETRON 4 MILLIGRAM(S): 8 TABLET, FILM COATED ORAL at 07:04

## 2017-04-21 RX ADMIN — OCTREOTIDE ACETATE 5 MICROGRAM(S)/HR: 200 INJECTION, SOLUTION INTRAVENOUS; SUBCUTANEOUS at 21:04

## 2017-04-21 RX ADMIN — Medication 0.5 MILLIGRAM(S): at 14:29

## 2017-04-21 RX ADMIN — Medication 4 MILLIGRAM(S): at 18:43

## 2017-04-21 NOTE — H&P ADULT. - PROBLEM SELECTOR PLAN 2
Pall care consulted to re-instate pt's home dilaudid PCA - monitor for response and side effects Pall care consulted for pain control - currently seems to still have home dilaudid pump connected to PICC line
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
## 2017-04-21 NOTE — H&P ADULT. - GASTROINTESTINAL COMMENTS
LLQ colostomy without any output, hernia at ostomy site appears reducible, nontender, slightly distended, soft.

## 2017-04-21 NOTE — ED PROVIDER NOTE - OBJECTIVE STATEMENT
76 year old b 76 year old brought in by ambulance. history of colon cancer with nephrostomy and colonoscopy. brought in for concern for obstruction. Recently discharged from our palliative care unit. PCA pain pump in place with patient using it at bedside. colostomy with no gas but unk time last changed. patient in no acute distress. Nurse from visiting service stating unclear goals of care re surgical intervention wishes, family called visiting nurse to home for no output in colostomy for 2 days.

## 2017-04-21 NOTE — ED PROVIDER NOTE - PHYSICAL EXAMINATION
Kelli: A & O x 3, NAD, HEENT with poor dentition, and no facial asymmetry; lungs CTAB, heart with reg rhythm without murmur; abdomen soft with colostomy in left lower quadrant with no gas or output; extremities with left lower extremity edema, skin with no rashes, neuro exam non focal with no motor or sensory deficits Kelli: not responding to verbal question but in no distress and is directable, HEENT with poor dentition, and no facial asymmetry; lungs CTAB, heart with reg rhythm without murmur; abdomen soft with colostomy in left lower quadrant with no gas or output; extremities with left lower extremity edema, skin with no rashes, neuro exam non focal with no motor or sensory deficits
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
## 2017-04-21 NOTE — H&P ADULT. - RADIOLOGY RESULTS AND INTERPRETATION
Radiology personally reviewed - CT A/P with metastatic disease, bowel obstruction with multiple transition points

## 2017-04-21 NOTE — H&P ADULT. - PROBLEM SELECTOR PLAN 4
Pt has chronic leg swelling - U/S with no apparent DVT but thought to have proximal obstruction from mass - will not anticoagulate
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
## 2017-04-21 NOTE — H&P ADULT. - PROBLEM SELECTOR PLAN 1
Multiple transition points indicative of etiology being masses or adhesions  Need to clarify goals of care with palliative care consult Multiple transition points indicative of etiology being masses or adhesions  Spoke with Dr. Shahid of [facility] - surgery not an option given advanced cancer and debilitated state  Will manage symptomatically - pain control, anti-emetics, ativan prn, IV fluids

## 2017-04-21 NOTE — GOALS OF CARE CONVERSATION - PERSONAL ADVANCE DIRECTIVE - CONVERSATION DETAILS
Through the use of a Mandarin speaking  who knows patient from prior admission, patient determined to have capacity and able to demonstrate understanding of diagnosis and prognosis. She designated herself a DNR/DNI and indicated preference for comfort. Please see SW notes from 4/21/17 for additional information.
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
## 2017-04-21 NOTE — ED PROVIDER NOTE - ATTENDING CONTRIBUTION TO CARE
pt is a 75 y/o female hospice patient with metastic colon ca arrives with unclear reason possible bowel obstruction, chronic abd pain on dilaudid pca.  pt with abd pain with no air in osteomy, plain films ordered, labs, discuss with hospice.

## 2017-04-22 PROCEDURE — 71010: CPT | Mod: 26

## 2017-04-22 PROCEDURE — 99233 SBSQ HOSP IP/OBS HIGH 50: CPT | Mod: GC

## 2017-04-22 RX ORDER — HYDROCORTISONE 1 %
1 OINTMENT (GRAM) TOPICAL THREE TIMES A DAY
Qty: 0 | Refills: 0 | Status: DISCONTINUED | OUTPATIENT
Start: 2017-04-22 | End: 2017-05-08

## 2017-04-22 RX ORDER — SODIUM CHLORIDE 9 MG/ML
1000 INJECTION, SOLUTION INTRAVENOUS
Qty: 0 | Refills: 0 | Status: DISCONTINUED | OUTPATIENT
Start: 2017-04-22 | End: 2017-04-28

## 2017-04-22 RX ADMIN — Medication 650 MILLIGRAM(S): at 08:35

## 2017-04-22 RX ADMIN — Medication 1 SUPPOSITORY(S): at 10:43

## 2017-04-22 RX ADMIN — Medication 650 MILLIGRAM(S): at 15:08

## 2017-04-22 RX ADMIN — ONDANSETRON 4 MILLIGRAM(S): 8 TABLET, FILM COATED ORAL at 01:18

## 2017-04-22 RX ADMIN — HYDROMORPHONE HYDROCHLORIDE 30 MILLILITER(S): 2 INJECTION INTRAMUSCULAR; INTRAVENOUS; SUBCUTANEOUS at 07:21

## 2017-04-22 RX ADMIN — ONDANSETRON 4 MILLIGRAM(S): 8 TABLET, FILM COATED ORAL at 17:34

## 2017-04-22 RX ADMIN — HYDROMORPHONE HYDROCHLORIDE 30 MILLILITER(S): 2 INJECTION INTRAMUSCULAR; INTRAVENOUS; SUBCUTANEOUS at 17:06

## 2017-04-22 RX ADMIN — OCTREOTIDE ACETATE 5 MICROGRAM(S)/HR: 200 INJECTION, SOLUTION INTRAVENOUS; SUBCUTANEOUS at 08:12

## 2017-04-22 RX ADMIN — SODIUM CHLORIDE 75 MILLILITER(S): 9 INJECTION, SOLUTION INTRAVENOUS at 01:32

## 2017-04-22 RX ADMIN — SODIUM CHLORIDE 75 MILLILITER(S): 9 INJECTION, SOLUTION INTRAVENOUS at 08:12

## 2017-04-22 RX ADMIN — ENOXAPARIN SODIUM 40 MILLIGRAM(S): 100 INJECTION SUBCUTANEOUS at 17:35

## 2017-04-22 RX ADMIN — HYDROMORPHONE HYDROCHLORIDE 30 MILLILITER(S): 2 INJECTION INTRAMUSCULAR; INTRAVENOUS; SUBCUTANEOUS at 04:13

## 2017-04-22 RX ADMIN — Medication 4 MILLIGRAM(S): at 06:19

## 2017-04-22 RX ADMIN — OCTREOTIDE ACETATE 5 MICROGRAM(S)/HR: 200 INJECTION, SOLUTION INTRAVENOUS; SUBCUTANEOUS at 15:07

## 2017-04-22 RX ADMIN — HYDROMORPHONE HYDROCHLORIDE 30 MILLILITER(S): 2 INJECTION INTRAMUSCULAR; INTRAVENOUS; SUBCUTANEOUS at 19:09

## 2017-04-22 RX ADMIN — ONDANSETRON 4 MILLIGRAM(S): 8 TABLET, FILM COATED ORAL at 08:22

## 2017-04-22 RX ADMIN — Medication 4 MILLIGRAM(S): at 17:31

## 2017-04-23 PROCEDURE — 99233 SBSQ HOSP IP/OBS HIGH 50: CPT | Mod: GC

## 2017-04-23 RX ADMIN — HYDROMORPHONE HYDROCHLORIDE 30 MILLILITER(S): 2 INJECTION INTRAMUSCULAR; INTRAVENOUS; SUBCUTANEOUS at 07:14

## 2017-04-23 RX ADMIN — HYDROMORPHONE HYDROCHLORIDE 30 MILLILITER(S): 2 INJECTION INTRAMUSCULAR; INTRAVENOUS; SUBCUTANEOUS at 19:04

## 2017-04-23 RX ADMIN — OCTREOTIDE ACETATE 5 MICROGRAM(S)/HR: 200 INJECTION, SOLUTION INTRAVENOUS; SUBCUTANEOUS at 20:12

## 2017-04-23 RX ADMIN — Medication 4 MILLIGRAM(S): at 17:22

## 2017-04-23 RX ADMIN — ENOXAPARIN SODIUM 40 MILLIGRAM(S): 100 INJECTION SUBCUTANEOUS at 17:50

## 2017-04-23 RX ADMIN — ONDANSETRON 4 MILLIGRAM(S): 8 TABLET, FILM COATED ORAL at 09:21

## 2017-04-23 RX ADMIN — OCTREOTIDE ACETATE 5 MICROGRAM(S)/HR: 200 INJECTION, SOLUTION INTRAVENOUS; SUBCUTANEOUS at 12:58

## 2017-04-23 RX ADMIN — Medication 4 MILLIGRAM(S): at 06:01

## 2017-04-23 RX ADMIN — HYDROMORPHONE HYDROCHLORIDE 30 MILLILITER(S): 2 INJECTION INTRAMUSCULAR; INTRAVENOUS; SUBCUTANEOUS at 13:06

## 2017-04-23 RX ADMIN — ONDANSETRON 4 MILLIGRAM(S): 8 TABLET, FILM COATED ORAL at 01:17

## 2017-04-23 RX ADMIN — ONDANSETRON 4 MILLIGRAM(S): 8 TABLET, FILM COATED ORAL at 17:22

## 2017-04-23 RX ADMIN — SODIUM CHLORIDE 50 MILLILITER(S): 9 INJECTION, SOLUTION INTRAVENOUS at 12:59

## 2017-04-23 RX ADMIN — SODIUM CHLORIDE 50 MILLILITER(S): 9 INJECTION, SOLUTION INTRAVENOUS at 06:01

## 2017-04-24 PROCEDURE — 99233 SBSQ HOSP IP/OBS HIGH 50: CPT | Mod: GC

## 2017-04-24 RX ORDER — HALOPERIDOL DECANOATE 100 MG/ML
0.5 INJECTION INTRAMUSCULAR EVERY 6 HOURS
Qty: 0 | Refills: 0 | Status: DISCONTINUED | OUTPATIENT
Start: 2017-04-24 | End: 2017-04-27

## 2017-04-24 RX ADMIN — HYDROMORPHONE HYDROCHLORIDE 30 MILLILITER(S): 2 INJECTION INTRAMUSCULAR; INTRAVENOUS; SUBCUTANEOUS at 07:15

## 2017-04-24 RX ADMIN — HALOPERIDOL DECANOATE 0.5 MILLIGRAM(S): 100 INJECTION INTRAMUSCULAR at 21:00

## 2017-04-24 RX ADMIN — HYDROMORPHONE HYDROCHLORIDE 2 MILLIGRAM(S): 2 INJECTION INTRAMUSCULAR; INTRAVENOUS; SUBCUTANEOUS at 21:08

## 2017-04-24 RX ADMIN — OCTREOTIDE ACETATE 5 MICROGRAM(S)/HR: 200 INJECTION, SOLUTION INTRAVENOUS; SUBCUTANEOUS at 08:51

## 2017-04-24 RX ADMIN — ONDANSETRON 4 MILLIGRAM(S): 8 TABLET, FILM COATED ORAL at 21:03

## 2017-04-24 RX ADMIN — HYDROMORPHONE HYDROCHLORIDE 30 MILLILITER(S): 2 INJECTION INTRAMUSCULAR; INTRAVENOUS; SUBCUTANEOUS at 19:22

## 2017-04-24 RX ADMIN — HYDROMORPHONE HYDROCHLORIDE 30 MILLILITER(S): 2 INJECTION INTRAMUSCULAR; INTRAVENOUS; SUBCUTANEOUS at 12:14

## 2017-04-24 RX ADMIN — ONDANSETRON 4 MILLIGRAM(S): 8 TABLET, FILM COATED ORAL at 01:37

## 2017-04-24 RX ADMIN — Medication 4 MILLIGRAM(S): at 06:05

## 2017-04-24 RX ADMIN — ONDANSETRON 4 MILLIGRAM(S): 8 TABLET, FILM COATED ORAL at 08:51

## 2017-04-24 RX ADMIN — Medication 4 MILLIGRAM(S): at 17:24

## 2017-04-24 RX ADMIN — HYDROMORPHONE HYDROCHLORIDE 30 MILLILITER(S): 2 INJECTION INTRAMUSCULAR; INTRAVENOUS; SUBCUTANEOUS at 12:48

## 2017-04-24 RX ADMIN — OCTREOTIDE ACETATE 5 MICROGRAM(S)/HR: 200 INJECTION, SOLUTION INTRAVENOUS; SUBCUTANEOUS at 17:57

## 2017-04-24 RX ADMIN — SODIUM CHLORIDE 50 MILLILITER(S): 9 INJECTION, SOLUTION INTRAVENOUS at 00:14

## 2017-04-24 RX ADMIN — ONDANSETRON 4 MILLIGRAM(S): 8 TABLET, FILM COATED ORAL at 16:50

## 2017-04-24 RX ADMIN — SODIUM CHLORIDE 50 MILLILITER(S): 9 INJECTION, SOLUTION INTRAVENOUS at 08:51

## 2017-04-24 RX ADMIN — ENOXAPARIN SODIUM 40 MILLIGRAM(S): 100 INJECTION SUBCUTANEOUS at 17:55

## 2017-04-25 PROCEDURE — 99233 SBSQ HOSP IP/OBS HIGH 50: CPT | Mod: GC

## 2017-04-25 PROCEDURE — 99222 1ST HOSP IP/OBS MODERATE 55: CPT

## 2017-04-25 RX ADMIN — Medication 4 MILLIGRAM(S): at 05:27

## 2017-04-25 RX ADMIN — ONDANSETRON 4 MILLIGRAM(S): 8 TABLET, FILM COATED ORAL at 18:10

## 2017-04-25 RX ADMIN — Medication 0.5 MILLIGRAM(S): at 22:59

## 2017-04-25 RX ADMIN — HYDROMORPHONE HYDROCHLORIDE 30 MILLILITER(S): 2 INJECTION INTRAMUSCULAR; INTRAVENOUS; SUBCUTANEOUS at 05:40

## 2017-04-25 RX ADMIN — SODIUM CHLORIDE 50 MILLILITER(S): 9 INJECTION, SOLUTION INTRAVENOUS at 05:27

## 2017-04-25 RX ADMIN — HYDROMORPHONE HYDROCHLORIDE 30 MILLILITER(S): 2 INJECTION INTRAMUSCULAR; INTRAVENOUS; SUBCUTANEOUS at 19:19

## 2017-04-25 RX ADMIN — OCTREOTIDE ACETATE 5 MICROGRAM(S)/HR: 200 INJECTION, SOLUTION INTRAVENOUS; SUBCUTANEOUS at 05:27

## 2017-04-25 RX ADMIN — OCTREOTIDE ACETATE 5 MICROGRAM(S)/HR: 200 INJECTION, SOLUTION INTRAVENOUS; SUBCUTANEOUS at 21:34

## 2017-04-25 RX ADMIN — HYDROMORPHONE HYDROCHLORIDE 30 MILLILITER(S): 2 INJECTION INTRAMUSCULAR; INTRAVENOUS; SUBCUTANEOUS at 07:08

## 2017-04-25 RX ADMIN — HYDROMORPHONE HYDROCHLORIDE 30 MILLILITER(S): 2 INJECTION INTRAMUSCULAR; INTRAVENOUS; SUBCUTANEOUS at 22:54

## 2017-04-25 RX ADMIN — SODIUM CHLORIDE 50 MILLILITER(S): 9 INJECTION, SOLUTION INTRAVENOUS at 23:46

## 2017-04-25 RX ADMIN — ONDANSETRON 4 MILLIGRAM(S): 8 TABLET, FILM COATED ORAL at 09:28

## 2017-04-25 RX ADMIN — Medication 4 MILLIGRAM(S): at 18:10

## 2017-04-25 RX ADMIN — ENOXAPARIN SODIUM 40 MILLIGRAM(S): 100 INJECTION SUBCUTANEOUS at 18:19

## 2017-04-26 PROCEDURE — 99233 SBSQ HOSP IP/OBS HIGH 50: CPT | Mod: GC

## 2017-04-26 PROCEDURE — 74176 CT ABD & PELVIS W/O CONTRAST: CPT | Mod: 26

## 2017-04-26 RX ORDER — PANTOPRAZOLE SODIUM 20 MG/1
40 TABLET, DELAYED RELEASE ORAL DAILY
Qty: 0 | Refills: 0 | Status: DISCONTINUED | OUTPATIENT
Start: 2017-04-26 | End: 2017-05-04

## 2017-04-26 RX ORDER — NYSTATIN CREAM 100000 [USP'U]/G
1 CREAM TOPICAL EVERY 8 HOURS
Qty: 0 | Refills: 0 | Status: DISCONTINUED | OUTPATIENT
Start: 2017-04-26 | End: 2017-05-08

## 2017-04-26 RX ORDER — HYDROMORPHONE HYDROCHLORIDE 2 MG/ML
3 INJECTION INTRAMUSCULAR; INTRAVENOUS; SUBCUTANEOUS
Qty: 0 | Refills: 0 | Status: DISCONTINUED | OUTPATIENT
Start: 2017-04-26 | End: 2017-04-30

## 2017-04-26 RX ORDER — HYDROMORPHONE HYDROCHLORIDE 2 MG/ML
30 INJECTION INTRAMUSCULAR; INTRAVENOUS; SUBCUTANEOUS
Qty: 0 | Refills: 0 | Status: DISCONTINUED | OUTPATIENT
Start: 2017-04-26 | End: 2017-04-30

## 2017-04-26 RX ORDER — HYDROMORPHONE HYDROCHLORIDE 2 MG/ML
2 INJECTION INTRAMUSCULAR; INTRAVENOUS; SUBCUTANEOUS
Qty: 0 | Refills: 0 | Status: DISCONTINUED | OUTPATIENT
Start: 2017-04-26 | End: 2017-04-26

## 2017-04-26 RX ADMIN — Medication 4 MILLIGRAM(S): at 05:55

## 2017-04-26 RX ADMIN — PANTOPRAZOLE SODIUM 40 MILLIGRAM(S): 20 TABLET, DELAYED RELEASE ORAL at 16:46

## 2017-04-26 RX ADMIN — HYDROMORPHONE HYDROCHLORIDE 30 MILLILITER(S): 2 INJECTION INTRAMUSCULAR; INTRAVENOUS; SUBCUTANEOUS at 19:38

## 2017-04-26 RX ADMIN — OCTREOTIDE ACETATE 5 MICROGRAM(S)/HR: 200 INJECTION, SOLUTION INTRAVENOUS; SUBCUTANEOUS at 17:29

## 2017-04-26 RX ADMIN — HYDROMORPHONE HYDROCHLORIDE 30 MILLILITER(S): 2 INJECTION INTRAMUSCULAR; INTRAVENOUS; SUBCUTANEOUS at 07:04

## 2017-04-26 RX ADMIN — ONDANSETRON 4 MILLIGRAM(S): 8 TABLET, FILM COATED ORAL at 00:43

## 2017-04-26 RX ADMIN — ENOXAPARIN SODIUM 40 MILLIGRAM(S): 100 INJECTION SUBCUTANEOUS at 17:18

## 2017-04-26 RX ADMIN — ONDANSETRON 4 MILLIGRAM(S): 8 TABLET, FILM COATED ORAL at 10:17

## 2017-04-26 RX ADMIN — SODIUM CHLORIDE 50 MILLILITER(S): 9 INJECTION, SOLUTION INTRAVENOUS at 21:04

## 2017-04-26 RX ADMIN — ONDANSETRON 4 MILLIGRAM(S): 8 TABLET, FILM COATED ORAL at 16:47

## 2017-04-26 RX ADMIN — Medication 4 MILLIGRAM(S): at 17:16

## 2017-04-26 RX ADMIN — OCTREOTIDE ACETATE 5 MICROGRAM(S)/HR: 200 INJECTION, SOLUTION INTRAVENOUS; SUBCUTANEOUS at 21:04

## 2017-04-26 RX ADMIN — HYDROMORPHONE HYDROCHLORIDE 30 MILLILITER(S): 2 INJECTION INTRAMUSCULAR; INTRAVENOUS; SUBCUTANEOUS at 10:57

## 2017-04-26 NOTE — DIETITIAN INITIAL EVALUATION ADULT. - PROBLEM SELECTOR PLAN 1
Multiple transition points indicative of etiology being masses or adhesions  Spoke with Dr. Shahid of [facility] - surgery not an option given advanced cancer and debilitated state  Will manage symptomatically - pain control, anti-emetics, ativan prn, IV fluids

## 2017-04-26 NOTE — DIETITIAN INITIAL EVALUATION ADULT. - ENERGY NEEDS
Pt seen for inadequate diet in house. Pt with PMH including metastatic colon cancer with mets to lung/liver, lymph nodes c large pelvic mass S/P rashid procedure and colostomy, dilshad nephrostomy tubes admitted with no colostomy output x 2 days, no surgical intervention, pt c flaus and ostomy output now. Likely plan for discharge home c hospice services again, pt DNR/DNI comfort measures only.    Ht:5'2" , Wt:100 lbs (daughter stated 4/5/17), BMI:18.3 kg/m2, IBW:110 lbs +/- 10%, %IBW: 91%  No edema, Skin intact

## 2017-04-26 NOTE — DIETITIAN INITIAL EVALUATION ADULT. - NS AS NUTRI INTERV MEALS SNACK
1. Advance diet as medically feasible, 2. Monitor pt amenability to oral supplements, has accepted ensure enlive in the past, 3. Monitor diet advance/tolerance, wt trends, labs, and skin integrity, 4. RD to remain available as needed

## 2017-04-26 NOTE — DIETITIAN INITIAL EVALUATION ADULT. - SOURCE
other (specify)/comprehensive chart review, MD, previous RD note 4/5/17-RD familiar with pt from previous admission

## 2017-04-26 NOTE — DIETITIAN INITIAL EVALUATION ADULT. - ORAL INTAKE PTA
Unknown, per previous RD note pt would eat 2-3 meals per day supplemented by ensure or smoothies, pt currently admitted c small bowel obstruction

## 2017-04-26 NOTE — DIETITIAN INITIAL EVALUATION ADULT. - FEEDING SKILL
Per pt profile pt requires assistive person with meals, per previous RD note daughter assisted pt with feed

## 2017-04-26 NOTE — DIETITIAN INITIAL EVALUATION ADULT. - PROBLEM SELECTOR PLAN 2
Pall care consulted for pain control - currently seems to still have home dilaudid pump connected to PICC line

## 2017-04-26 NOTE — DIETITIAN INITIAL EVALUATION ADULT. - OTHER INFO
Per previous RD note pt with history of weight loss from  lbs 1.5 years ago, but over the course of this timeframe, weight has continued to decline, noted hospital weights of 137.5 lbs (11/2016), 122.3 lbs (12/2016, 2/23/2017, 3/30/2017), current admit weight 134.9lbs (4/21 bed), per previous RD note, daughter had reported that she suspected pt's weight was ~100 lbs and hospital weight was inaccurate. Pt with no noted food allergies, no micronutrient supplementation. 600 mL colostomy output 4/25, none documented thus far today. Per pt profile pt noted with difficulty swallowing, per MD-pt's mental status did not seem conducive for oral feeds, diet progressed to clear liquid diet today. Pt previously NPO since 4/21.

## 2017-04-27 PROCEDURE — 99233 SBSQ HOSP IP/OBS HIGH 50: CPT | Mod: GC

## 2017-04-27 RX ORDER — HALOPERIDOL DECANOATE 100 MG/ML
0.5 INJECTION INTRAMUSCULAR EVERY 6 HOURS
Qty: 0 | Refills: 0 | Status: DISCONTINUED | OUTPATIENT
Start: 2017-04-27 | End: 2017-05-08

## 2017-04-27 RX ADMIN — HYDROMORPHONE HYDROCHLORIDE 30 MILLILITER(S): 2 INJECTION INTRAMUSCULAR; INTRAVENOUS; SUBCUTANEOUS at 12:55

## 2017-04-27 RX ADMIN — SODIUM CHLORIDE 50 MILLILITER(S): 9 INJECTION, SOLUTION INTRAVENOUS at 19:21

## 2017-04-27 RX ADMIN — ENOXAPARIN SODIUM 40 MILLIGRAM(S): 100 INJECTION SUBCUTANEOUS at 18:06

## 2017-04-27 RX ADMIN — ONDANSETRON 4 MILLIGRAM(S): 8 TABLET, FILM COATED ORAL at 09:20

## 2017-04-27 RX ADMIN — Medication 4 MILLIGRAM(S): at 18:06

## 2017-04-27 RX ADMIN — ONDANSETRON 4 MILLIGRAM(S): 8 TABLET, FILM COATED ORAL at 16:26

## 2017-04-27 RX ADMIN — Medication 4 MILLIGRAM(S): at 05:51

## 2017-04-27 RX ADMIN — ONDANSETRON 4 MILLIGRAM(S): 8 TABLET, FILM COATED ORAL at 00:55

## 2017-04-27 RX ADMIN — OCTREOTIDE ACETATE 5 MICROGRAM(S)/HR: 200 INJECTION, SOLUTION INTRAVENOUS; SUBCUTANEOUS at 19:20

## 2017-04-27 RX ADMIN — HYDROMORPHONE HYDROCHLORIDE 30 MILLILITER(S): 2 INJECTION INTRAMUSCULAR; INTRAVENOUS; SUBCUTANEOUS at 18:56

## 2017-04-27 RX ADMIN — OCTREOTIDE ACETATE 5 MICROGRAM(S)/HR: 200 INJECTION, SOLUTION INTRAVENOUS; SUBCUTANEOUS at 16:24

## 2017-04-27 RX ADMIN — PANTOPRAZOLE SODIUM 40 MILLIGRAM(S): 20 TABLET, DELAYED RELEASE ORAL at 13:02

## 2017-04-27 RX ADMIN — HYDROMORPHONE HYDROCHLORIDE 30 MILLILITER(S): 2 INJECTION INTRAMUSCULAR; INTRAVENOUS; SUBCUTANEOUS at 07:13

## 2017-04-28 PROCEDURE — 99233 SBSQ HOSP IP/OBS HIGH 50: CPT | Mod: GC

## 2017-04-28 RX ORDER — SODIUM CHLORIDE 9 MG/ML
1000 INJECTION INTRAMUSCULAR; INTRAVENOUS; SUBCUTANEOUS
Qty: 0 | Refills: 0 | Status: DISCONTINUED | OUTPATIENT
Start: 2017-04-28 | End: 2017-05-08

## 2017-04-28 RX ADMIN — HYDROMORPHONE HYDROCHLORIDE 30 MILLILITER(S): 2 INJECTION INTRAMUSCULAR; INTRAVENOUS; SUBCUTANEOUS at 07:22

## 2017-04-28 RX ADMIN — OCTREOTIDE ACETATE 5 MICROGRAM(S)/HR: 200 INJECTION, SOLUTION INTRAVENOUS; SUBCUTANEOUS at 13:20

## 2017-04-28 RX ADMIN — SODIUM CHLORIDE 20 MILLILITER(S): 9 INJECTION INTRAMUSCULAR; INTRAVENOUS; SUBCUTANEOUS at 11:28

## 2017-04-28 RX ADMIN — HYDROMORPHONE HYDROCHLORIDE 30 MILLILITER(S): 2 INJECTION INTRAMUSCULAR; INTRAVENOUS; SUBCUTANEOUS at 05:59

## 2017-04-28 RX ADMIN — Medication 4 MILLIGRAM(S): at 05:29

## 2017-04-28 RX ADMIN — ONDANSETRON 4 MILLIGRAM(S): 8 TABLET, FILM COATED ORAL at 09:57

## 2017-04-28 RX ADMIN — ONDANSETRON 4 MILLIGRAM(S): 8 TABLET, FILM COATED ORAL at 00:53

## 2017-04-28 RX ADMIN — HYDROMORPHONE HYDROCHLORIDE 30 MILLILITER(S): 2 INJECTION INTRAMUSCULAR; INTRAVENOUS; SUBCUTANEOUS at 22:53

## 2017-04-28 RX ADMIN — Medication 4 MILLIGRAM(S): at 18:01

## 2017-04-28 RX ADMIN — Medication 0.5 MILLIGRAM(S): at 20:14

## 2017-04-28 RX ADMIN — PANTOPRAZOLE SODIUM 40 MILLIGRAM(S): 20 TABLET, DELAYED RELEASE ORAL at 11:28

## 2017-04-28 RX ADMIN — HYDROMORPHONE HYDROCHLORIDE 30 MILLILITER(S): 2 INJECTION INTRAMUSCULAR; INTRAVENOUS; SUBCUTANEOUS at 19:11

## 2017-04-28 RX ADMIN — ENOXAPARIN SODIUM 40 MILLIGRAM(S): 100 INJECTION SUBCUTANEOUS at 18:01

## 2017-04-28 RX ADMIN — ONDANSETRON 4 MILLIGRAM(S): 8 TABLET, FILM COATED ORAL at 18:01

## 2017-04-29 PROCEDURE — 99233 SBSQ HOSP IP/OBS HIGH 50: CPT | Mod: GC

## 2017-04-29 RX ADMIN — SODIUM CHLORIDE 20 MILLILITER(S): 9 INJECTION INTRAMUSCULAR; INTRAVENOUS; SUBCUTANEOUS at 07:10

## 2017-04-29 RX ADMIN — Medication 4 MILLIGRAM(S): at 05:34

## 2017-04-29 RX ADMIN — Medication 4 MILLIGRAM(S): at 18:25

## 2017-04-29 RX ADMIN — ONDANSETRON 4 MILLIGRAM(S): 8 TABLET, FILM COATED ORAL at 09:06

## 2017-04-29 RX ADMIN — PANTOPRAZOLE SODIUM 40 MILLIGRAM(S): 20 TABLET, DELAYED RELEASE ORAL at 13:30

## 2017-04-29 RX ADMIN — ONDANSETRON 4 MILLIGRAM(S): 8 TABLET, FILM COATED ORAL at 18:25

## 2017-04-29 RX ADMIN — HYDROMORPHONE HYDROCHLORIDE 30 MILLILITER(S): 2 INJECTION INTRAMUSCULAR; INTRAVENOUS; SUBCUTANEOUS at 19:24

## 2017-04-29 RX ADMIN — HYDROMORPHONE HYDROCHLORIDE 30 MILLILITER(S): 2 INJECTION INTRAMUSCULAR; INTRAVENOUS; SUBCUTANEOUS at 07:09

## 2017-04-29 RX ADMIN — ONDANSETRON 4 MILLIGRAM(S): 8 TABLET, FILM COATED ORAL at 01:11

## 2017-04-29 RX ADMIN — OCTREOTIDE ACETATE 5 MICROGRAM(S)/HR: 200 INJECTION, SOLUTION INTRAVENOUS; SUBCUTANEOUS at 07:10

## 2017-04-29 RX ADMIN — HYDROMORPHONE HYDROCHLORIDE 30 MILLILITER(S): 2 INJECTION INTRAMUSCULAR; INTRAVENOUS; SUBCUTANEOUS at 16:25

## 2017-04-29 RX ADMIN — ENOXAPARIN SODIUM 40 MILLIGRAM(S): 100 INJECTION SUBCUTANEOUS at 18:25

## 2017-04-30 PROCEDURE — 99233 SBSQ HOSP IP/OBS HIGH 50: CPT | Mod: GC

## 2017-04-30 RX ORDER — HYDROMORPHONE HYDROCHLORIDE 2 MG/ML
1.5 INJECTION INTRAMUSCULAR; INTRAVENOUS; SUBCUTANEOUS
Qty: 100 | Refills: 0 | Status: DISCONTINUED | OUTPATIENT
Start: 2017-04-30 | End: 2017-05-02

## 2017-04-30 RX ORDER — HYDROMORPHONE HYDROCHLORIDE 2 MG/ML
3 INJECTION INTRAMUSCULAR; INTRAVENOUS; SUBCUTANEOUS
Qty: 0 | Refills: 0 | Status: DISCONTINUED | OUTPATIENT
Start: 2017-04-30 | End: 2017-05-02

## 2017-04-30 RX ADMIN — ENOXAPARIN SODIUM 40 MILLIGRAM(S): 100 INJECTION SUBCUTANEOUS at 18:21

## 2017-04-30 RX ADMIN — Medication 4 MILLIGRAM(S): at 18:22

## 2017-04-30 RX ADMIN — OCTREOTIDE ACETATE 5 MICROGRAM(S)/HR: 200 INJECTION, SOLUTION INTRAVENOUS; SUBCUTANEOUS at 07:40

## 2017-04-30 RX ADMIN — SODIUM CHLORIDE 20 MILLILITER(S): 9 INJECTION INTRAMUSCULAR; INTRAVENOUS; SUBCUTANEOUS at 07:40

## 2017-04-30 RX ADMIN — PANTOPRAZOLE SODIUM 40 MILLIGRAM(S): 20 TABLET, DELAYED RELEASE ORAL at 11:24

## 2017-04-30 RX ADMIN — HYDROMORPHONE HYDROCHLORIDE 30 MILLILITER(S): 2 INJECTION INTRAMUSCULAR; INTRAVENOUS; SUBCUTANEOUS at 07:38

## 2017-04-30 RX ADMIN — Medication 4 MILLIGRAM(S): at 05:11

## 2017-04-30 RX ADMIN — HYDROMORPHONE HYDROCHLORIDE 1.5 MG/HR: 2 INJECTION INTRAMUSCULAR; INTRAVENOUS; SUBCUTANEOUS at 11:10

## 2017-04-30 RX ADMIN — ONDANSETRON 4 MILLIGRAM(S): 8 TABLET, FILM COATED ORAL at 18:22

## 2017-04-30 RX ADMIN — ONDANSETRON 4 MILLIGRAM(S): 8 TABLET, FILM COATED ORAL at 01:11

## 2017-04-30 RX ADMIN — HYDROMORPHONE HYDROCHLORIDE 30 MILLILITER(S): 2 INJECTION INTRAMUSCULAR; INTRAVENOUS; SUBCUTANEOUS at 09:17

## 2017-04-30 RX ADMIN — OCTREOTIDE ACETATE 5 MICROGRAM(S)/HR: 200 INJECTION, SOLUTION INTRAVENOUS; SUBCUTANEOUS at 05:20

## 2017-04-30 RX ADMIN — SODIUM CHLORIDE 20 MILLILITER(S): 9 INJECTION INTRAMUSCULAR; INTRAVENOUS; SUBCUTANEOUS at 05:11

## 2017-04-30 RX ADMIN — ONDANSETRON 4 MILLIGRAM(S): 8 TABLET, FILM COATED ORAL at 11:10

## 2017-05-01 PROCEDURE — 99233 SBSQ HOSP IP/OBS HIGH 50: CPT | Mod: GC

## 2017-05-01 RX ORDER — OCTREOTIDE ACETATE 200 UG/ML
10 INJECTION, SOLUTION INTRAVENOUS; SUBCUTANEOUS ONCE
Qty: 0 | Refills: 0 | Status: COMPLETED | OUTPATIENT
Start: 2017-05-02 | End: 2017-05-08

## 2017-05-01 RX ADMIN — OCTREOTIDE ACETATE 5 MICROGRAM(S)/HR: 200 INJECTION, SOLUTION INTRAVENOUS; SUBCUTANEOUS at 17:25

## 2017-05-01 RX ADMIN — ONDANSETRON 4 MILLIGRAM(S): 8 TABLET, FILM COATED ORAL at 17:25

## 2017-05-01 RX ADMIN — SODIUM CHLORIDE 20 MILLILITER(S): 9 INJECTION INTRAMUSCULAR; INTRAVENOUS; SUBCUTANEOUS at 03:00

## 2017-05-01 RX ADMIN — OCTREOTIDE ACETATE 5 MICROGRAM(S)/HR: 200 INJECTION, SOLUTION INTRAVENOUS; SUBCUTANEOUS at 00:00

## 2017-05-01 RX ADMIN — Medication 4 MILLIGRAM(S): at 17:25

## 2017-05-01 RX ADMIN — SODIUM CHLORIDE 20 MILLILITER(S): 9 INJECTION INTRAMUSCULAR; INTRAVENOUS; SUBCUTANEOUS at 07:36

## 2017-05-01 RX ADMIN — ENOXAPARIN SODIUM 40 MILLIGRAM(S): 100 INJECTION SUBCUTANEOUS at 18:02

## 2017-05-01 RX ADMIN — Medication 4 MILLIGRAM(S): at 05:46

## 2017-05-01 RX ADMIN — ONDANSETRON 4 MILLIGRAM(S): 8 TABLET, FILM COATED ORAL at 00:25

## 2017-05-01 RX ADMIN — HYDROMORPHONE HYDROCHLORIDE 1.5 MG/HR: 2 INJECTION INTRAMUSCULAR; INTRAVENOUS; SUBCUTANEOUS at 15:59

## 2017-05-01 RX ADMIN — HYDROMORPHONE HYDROCHLORIDE 1.5 MG/HR: 2 INJECTION INTRAMUSCULAR; INTRAVENOUS; SUBCUTANEOUS at 07:36

## 2017-05-01 RX ADMIN — OCTREOTIDE ACETATE 5 MICROGRAM(S)/HR: 200 INJECTION, SOLUTION INTRAVENOUS; SUBCUTANEOUS at 07:37

## 2017-05-01 RX ADMIN — PANTOPRAZOLE SODIUM 40 MILLIGRAM(S): 20 TABLET, DELAYED RELEASE ORAL at 11:47

## 2017-05-02 ENCOUNTER — TRANSCRIPTION ENCOUNTER (OUTPATIENT)
Age: 77
End: 2017-05-02

## 2017-05-02 PROCEDURE — 99233 SBSQ HOSP IP/OBS HIGH 50: CPT | Mod: GC

## 2017-05-02 RX ORDER — HYDROCORTISONE 1 %
1 OINTMENT (GRAM) TOPICAL
Qty: 0 | Refills: 0 | COMMUNITY
Start: 2017-05-02

## 2017-05-02 RX ORDER — HYDROMORPHONE HYDROCHLORIDE 2 MG/ML
3 INJECTION INTRAMUSCULAR; INTRAVENOUS; SUBCUTANEOUS
Qty: 0 | Refills: 0 | Status: DISCONTINUED | OUTPATIENT
Start: 2017-05-02 | End: 2017-05-08

## 2017-05-02 RX ORDER — HYDROMORPHONE HYDROCHLORIDE 2 MG/ML
1.5 INJECTION INTRAMUSCULAR; INTRAVENOUS; SUBCUTANEOUS
Qty: 100 | Refills: 0 | Status: DISCONTINUED | OUTPATIENT
Start: 2017-05-02 | End: 2017-05-08

## 2017-05-02 RX ORDER — HYDROMORPHONE HYDROCHLORIDE 2 MG/ML
30 INJECTION INTRAMUSCULAR; INTRAVENOUS; SUBCUTANEOUS
Qty: 0 | Refills: 0 | Status: DISCONTINUED | OUTPATIENT
Start: 2017-05-02 | End: 2017-05-02

## 2017-05-02 RX ORDER — HYDROMORPHONE HYDROCHLORIDE 2 MG/ML
2 INJECTION INTRAMUSCULAR; INTRAVENOUS; SUBCUTANEOUS
Qty: 108 | Refills: 0 | OUTPATIENT
Start: 2017-05-02

## 2017-05-02 RX ORDER — NYSTATIN CREAM 100000 [USP'U]/G
1 CREAM TOPICAL
Qty: 0 | Refills: 0 | COMMUNITY
Start: 2017-05-02

## 2017-05-02 RX ORDER — HYDROMORPHONE HYDROCHLORIDE 2 MG/ML
2 INJECTION INTRAMUSCULAR; INTRAVENOUS; SUBCUTANEOUS
Qty: 96 | Refills: 0 | OUTPATIENT
Start: 2017-05-02

## 2017-05-02 RX ORDER — OCTREOTIDE ACETATE 200 UG/ML
25 INJECTION, SOLUTION INTRAVENOUS; SUBCUTANEOUS
Qty: 500 | Refills: 0 | Status: DISCONTINUED | OUTPATIENT
Start: 2017-05-02 | End: 2017-05-08

## 2017-05-02 RX ADMIN — PANTOPRAZOLE SODIUM 40 MILLIGRAM(S): 20 TABLET, DELAYED RELEASE ORAL at 13:51

## 2017-05-02 RX ADMIN — OCTREOTIDE ACETATE 5 MICROGRAM(S)/HR: 200 INJECTION, SOLUTION INTRAVENOUS; SUBCUTANEOUS at 07:29

## 2017-05-02 RX ADMIN — HYDROMORPHONE HYDROCHLORIDE 1.5 MG/HR: 2 INJECTION INTRAMUSCULAR; INTRAVENOUS; SUBCUTANEOUS at 07:29

## 2017-05-02 RX ADMIN — Medication 4 MILLIGRAM(S): at 18:23

## 2017-05-02 RX ADMIN — Medication 4 MILLIGRAM(S): at 05:58

## 2017-05-02 RX ADMIN — OCTREOTIDE ACETATE 5 MICROGRAM(S)/HR: 200 INJECTION, SOLUTION INTRAVENOUS; SUBCUTANEOUS at 05:58

## 2017-05-02 RX ADMIN — ENOXAPARIN SODIUM 40 MILLIGRAM(S): 100 INJECTION SUBCUTANEOUS at 18:23

## 2017-05-02 RX ADMIN — ONDANSETRON 4 MILLIGRAM(S): 8 TABLET, FILM COATED ORAL at 18:23

## 2017-05-02 RX ADMIN — SODIUM CHLORIDE 20 MILLILITER(S): 9 INJECTION INTRAMUSCULAR; INTRAVENOUS; SUBCUTANEOUS at 07:30

## 2017-05-02 RX ADMIN — ONDANSETRON 4 MILLIGRAM(S): 8 TABLET, FILM COATED ORAL at 09:26

## 2017-05-02 RX ADMIN — ONDANSETRON 4 MILLIGRAM(S): 8 TABLET, FILM COATED ORAL at 01:17

## 2017-05-02 RX ADMIN — HYDROMORPHONE HYDROCHLORIDE 1.5 MG/HR: 2 INJECTION INTRAMUSCULAR; INTRAVENOUS; SUBCUTANEOUS at 16:30

## 2017-05-02 NOTE — DISCHARGE NOTE ADULT - PLAN OF CARE
Improvement of symptoms You were admitted with abd pain and found to have an obstruction of your small bowel. You were started on medications to improve this condition. Your obstruction improved and you will be discharged home on home hospice. You are to continue with octreotide depo injections at home to attempt to prevent further episodes. You have extensive pain requiring frequent pain medications. You were started on a PCA pump as you were at home with a brief episode of being on a drip due to encephalopathy. You improved and will go home with a PCA pump You have extensive metastatic colon cancer with no treatments offered. You are managed on home hospice. pain control comfort care

## 2017-05-02 NOTE — DISCHARGE NOTE ADULT - HOSPITAL COURSE
75 yo woman with PMH of metastatic colon CA to lung/liver/lymph nodes with large pelvic mass s/p Carmine and colostomy, s/p b/l nephrostomy tubes a/w no colostomy output.  Pt is on home hospice, has a dilaudid PCA via PICC line.  Pt presents after having had no colostomy output for 2 days. Abdominal CT showed malignant SBO and pt was admitted to the Palliative Care Unit. Over the course of her stay pt was made NPO and treated with octreotide. Pt was maintained on dilaudid PCA pump for comfort with a brief period on drip 2/2 encephalopathy. Pt's bowel obstruction improved during her stay with resumption of bowel function. Pt was asymptomatic and hemodynamically stable. Pt was discharged home with home hospice on Octreotide. 77 yo woman with PMH of metastatic colon CA to lung/liver/lymph nodes with large pelvic mass s/p Carmine and colostomy, s/p b/l nephrostomy tubes a/w no colostomy output.  Pt is on home hospice, has a dilaudid PCA via PICC line.  Pt presents after having had no colostomy output for 2 days. Abdominal CT showed malignant SBO and pt was admitted to the Palliative Care Unit. Over the course of her stay pt was made NPO and treated with octreotide. Pt was maintained on dilaudid PCA pump for comfort with a brief period on drip 2/2 encephalopathy. Pt's bowel obstruction improved during her stay with resumption of bowel function. Pt was asymptomatic and hemodynamically stable. Pt was discharged home with home hospice on Octreotide. Pt has some bladder urinary retention despite nephrostomy tubes and requests burns for drainage of bladder. Pt to go to [facility] for nephrostomy tube exchange per daughter. She is to be cared for at home with S hospice services.

## 2017-05-02 NOTE — DISCHARGE NOTE ADULT - MEDICATION SUMMARY - MEDICATIONS TO TAKE
I will START or STAY ON the medications listed below when I get home from the hospital:    decadron  -- 4 milligram(s) by mouth 2 times a day -for nausea  -- Indication: For nausea    Ativan 0.5 mg oral tablet  -- 0.5 milligram(s) by mouth 3 times a day -for agitation MDD:2mg  -- Caution federal law prohibits the transfer of this drug to any person other  than the person for whom it was prescribed.  Do not take this drug if you are pregnant.  May cause drowsiness.  Alcohol may intensify this effect.  Use care when operating dangerous machinery.    -- Indication: For Agitation    ondansetron 4 mg oral tablet  -- 1 tab(s) by mouth every 8 hours -for nausea  -- Indication: For nausea    hydrocortisone 25 mg rectal suppository  -- 1 suppository(ies) rectally 3 times a day, As needed, rectally pain  -- Indication: For rectal pain    nystatin 100,000 units/g topical cream  -- 1 application on skin every 8 hours, As needed, fungal rash  -- Indication: For rash    bisacodyl 10 mg rectal suppository  -- 1 suppository(ies) rectally once a day, As needed, Constipation  -- Indication: For Constipation    docusate sodium 100 mg oral capsule  -- 1 cap(s) by mouth 2 times a day  -- Indication: For Constipation    polyethylene glycol 3350 oral powder for reconstitution  -- 17 gram(s) by mouth 2 times a day  -- Indication: For Constipation    senna oral tablet  -- 2 tab(s) by mouth once a day (at bedtime)  -- Indication: For Constipation    simethicone 80 mg oral tablet, chewable  -- 1 tab(s) by mouth every 8 hours, As needed, Gas  -- Indication: For gas    atropine ophthalmic 1% ophthalmic solution  -- 1 drop(s) to each affected eye every 4 hours, As Needed for secretions  -- For the eye.    -- Indication: For Secretions in eyes    atropine ophthalmic 1% ophthalmic solution  -- 2 drop(s) to each affected eye every 6 hours -for dry eyes  -- For the eye.    -- Indication: For eye secretions    pantoprazole 40 mg oral delayed release tablet  -- 1 tab(s) by mouth once a day (before a meal)  -- Indication: For indigestion    pantoprazole 40 mg oral delayed release tablet  -- 1 tab(s) by mouth once a day (before a meal)  -- Indication: For indigestion

## 2017-05-02 NOTE — DISCHARGE NOTE ADULT - PATIENT PORTAL LINK FT
“You can access the [facility] Patient Portal, offered by [facility], by registering with the following website: [email or web address]”

## 2017-05-02 NOTE — DISCHARGE NOTE ADULT - CARE PLAN
Principal Discharge DX:	Small bowel obstruction  Goal:	Improvement of symptoms  Instructions for follow-up, activity and diet:	You were admitted with abd pain and found to have an obstruction of your small bowel. You were started on medications to improve this condition. Your obstruction improved and you will be discharged home on home hospice. You are to continue with octreotide depo injections at home to attempt to prevent further episodes.  Secondary Diagnosis:	Cancer related pain  Instructions for follow-up, activity and diet:	You have extensive pain requiring frequent pain medications. You were started on a PCA pump as you were at home with a brief episode of being on a drip due to encephalopathy. You improved and will go home with a PCA pump  Secondary Diagnosis:	Metastatic colon cancer to liver  Instructions for follow-up, activity and diet:	You have extensive metastatic colon cancer with no treatments offered. You are managed on home hospice. Principal Discharge DX:	Small bowel obstruction  Goal:	Improvement of symptoms  Instructions for follow-up, activity and diet:	You were admitted with abd pain and found to have an obstruction of your small bowel. You were started on medications to improve this condition. Your obstruction improved and you will be discharged home on home hospice. You are to continue with octreotide depo injections at home to attempt to prevent further episodes.  Secondary Diagnosis:	Cancer related pain  Goal:	pain control  Instructions for follow-up, activity and diet:	You have extensive pain requiring frequent pain medications. You were started on a PCA pump as you were at home with a brief episode of being on a drip due to encephalopathy. You improved and will go home with a PCA pump  Secondary Diagnosis:	Metastatic colon cancer to liver  Goal:	comfort care  Instructions for follow-up, activity and diet:	You have extensive metastatic colon cancer with no treatments offered. You are managed on home hospice.

## 2017-05-03 PROCEDURE — 99233 SBSQ HOSP IP/OBS HIGH 50: CPT | Mod: GC

## 2017-05-03 RX ADMIN — SODIUM CHLORIDE 20 MILLILITER(S): 9 INJECTION INTRAMUSCULAR; INTRAVENOUS; SUBCUTANEOUS at 03:00

## 2017-05-03 RX ADMIN — HYDROMORPHONE HYDROCHLORIDE 1.5 MG/HR: 2 INJECTION INTRAMUSCULAR; INTRAVENOUS; SUBCUTANEOUS at 18:30

## 2017-05-03 RX ADMIN — PANTOPRAZOLE SODIUM 40 MILLIGRAM(S): 20 TABLET, DELAYED RELEASE ORAL at 11:30

## 2017-05-03 RX ADMIN — Medication 4 MILLIGRAM(S): at 18:22

## 2017-05-03 RX ADMIN — OCTREOTIDE ACETATE 5 MICROGRAM(S)/HR: 200 INJECTION, SOLUTION INTRAVENOUS; SUBCUTANEOUS at 19:11

## 2017-05-03 RX ADMIN — ONDANSETRON 4 MILLIGRAM(S): 8 TABLET, FILM COATED ORAL at 17:51

## 2017-05-03 RX ADMIN — SODIUM CHLORIDE 20 MILLILITER(S): 9 INJECTION INTRAMUSCULAR; INTRAVENOUS; SUBCUTANEOUS at 08:07

## 2017-05-03 RX ADMIN — HYDROMORPHONE HYDROCHLORIDE 1.5 MG/HR: 2 INJECTION INTRAMUSCULAR; INTRAVENOUS; SUBCUTANEOUS at 08:06

## 2017-05-03 RX ADMIN — Medication 4 MILLIGRAM(S): at 05:19

## 2017-05-03 RX ADMIN — ENOXAPARIN SODIUM 40 MILLIGRAM(S): 100 INJECTION SUBCUTANEOUS at 18:22

## 2017-05-03 RX ADMIN — HYDROMORPHONE HYDROCHLORIDE 1.5 MG/HR: 2 INJECTION INTRAMUSCULAR; INTRAVENOUS; SUBCUTANEOUS at 19:11

## 2017-05-03 RX ADMIN — OCTREOTIDE ACETATE 5 MICROGRAM(S)/HR: 200 INJECTION, SOLUTION INTRAVENOUS; SUBCUTANEOUS at 08:06

## 2017-05-03 RX ADMIN — ONDANSETRON 4 MILLIGRAM(S): 8 TABLET, FILM COATED ORAL at 08:11

## 2017-05-03 RX ADMIN — ONDANSETRON 4 MILLIGRAM(S): 8 TABLET, FILM COATED ORAL at 00:29

## 2017-05-03 RX ADMIN — SODIUM CHLORIDE 20 MILLILITER(S): 9 INJECTION INTRAMUSCULAR; INTRAVENOUS; SUBCUTANEOUS at 19:11

## 2017-05-04 PROCEDURE — 99233 SBSQ HOSP IP/OBS HIGH 50: CPT | Mod: GC

## 2017-05-04 RX ORDER — PANTOPRAZOLE SODIUM 20 MG/1
40 TABLET, DELAYED RELEASE ORAL
Qty: 0 | Refills: 0 | Status: DISCONTINUED | OUTPATIENT
Start: 2017-05-04 | End: 2017-05-08

## 2017-05-04 RX ORDER — ONDANSETRON 8 MG/1
4 TABLET, FILM COATED ORAL EVERY 6 HOURS
Qty: 0 | Refills: 0 | Status: DISCONTINUED | OUTPATIENT
Start: 2017-05-04 | End: 2017-05-08

## 2017-05-04 RX ORDER — DEXAMETHASONE 0.5 MG/5ML
4 ELIXIR ORAL EVERY 12 HOURS
Qty: 0 | Refills: 0 | Status: DISCONTINUED | OUTPATIENT
Start: 2017-05-04 | End: 2017-05-08

## 2017-05-04 RX ADMIN — HYDROMORPHONE HYDROCHLORIDE 1.5 MG/HR: 2 INJECTION INTRAMUSCULAR; INTRAVENOUS; SUBCUTANEOUS at 07:20

## 2017-05-04 RX ADMIN — HYDROMORPHONE HYDROCHLORIDE 3 MILLIGRAM(S): 2 INJECTION INTRAMUSCULAR; INTRAVENOUS; SUBCUTANEOUS at 20:48

## 2017-05-04 RX ADMIN — HYDROMORPHONE HYDROCHLORIDE 1.5 MG/HR: 2 INJECTION INTRAMUSCULAR; INTRAVENOUS; SUBCUTANEOUS at 16:08

## 2017-05-04 RX ADMIN — HYDROMORPHONE HYDROCHLORIDE 3 MILLIGRAM(S): 2 INJECTION INTRAMUSCULAR; INTRAVENOUS; SUBCUTANEOUS at 09:04

## 2017-05-04 RX ADMIN — Medication 4 MILLIGRAM(S): at 17:28

## 2017-05-04 RX ADMIN — OCTREOTIDE ACETATE 5 MICROGRAM(S)/HR: 200 INJECTION, SOLUTION INTRAVENOUS; SUBCUTANEOUS at 19:21

## 2017-05-04 RX ADMIN — HYDROMORPHONE HYDROCHLORIDE 3 MILLIGRAM(S): 2 INJECTION INTRAMUSCULAR; INTRAVENOUS; SUBCUTANEOUS at 21:03

## 2017-05-04 RX ADMIN — OCTREOTIDE ACETATE 5 MICROGRAM(S)/HR: 200 INJECTION, SOLUTION INTRAVENOUS; SUBCUTANEOUS at 07:19

## 2017-05-04 RX ADMIN — ENOXAPARIN SODIUM 40 MILLIGRAM(S): 100 INJECTION SUBCUTANEOUS at 17:29

## 2017-05-04 RX ADMIN — ONDANSETRON 4 MILLIGRAM(S): 8 TABLET, FILM COATED ORAL at 08:29

## 2017-05-04 RX ADMIN — OCTREOTIDE ACETATE 5 MICROGRAM(S)/HR: 200 INJECTION, SOLUTION INTRAVENOUS; SUBCUTANEOUS at 16:08

## 2017-05-04 RX ADMIN — ONDANSETRON 4 MILLIGRAM(S): 8 TABLET, FILM COATED ORAL at 01:15

## 2017-05-04 RX ADMIN — HYDROMORPHONE HYDROCHLORIDE 1.5 MG/HR: 2 INJECTION INTRAMUSCULAR; INTRAVENOUS; SUBCUTANEOUS at 19:21

## 2017-05-04 RX ADMIN — Medication 4 MILLIGRAM(S): at 06:27

## 2017-05-04 RX ADMIN — SODIUM CHLORIDE 20 MILLILITER(S): 9 INJECTION INTRAMUSCULAR; INTRAVENOUS; SUBCUTANEOUS at 07:20

## 2017-05-04 RX ADMIN — HYDROMORPHONE HYDROCHLORIDE 3 MILLIGRAM(S): 2 INJECTION INTRAMUSCULAR; INTRAVENOUS; SUBCUTANEOUS at 09:20

## 2017-05-05 PROCEDURE — 99233 SBSQ HOSP IP/OBS HIGH 50: CPT | Mod: GC

## 2017-05-05 RX ADMIN — PANTOPRAZOLE SODIUM 40 MILLIGRAM(S): 20 TABLET, DELAYED RELEASE ORAL at 06:22

## 2017-05-05 RX ADMIN — ENOXAPARIN SODIUM 40 MILLIGRAM(S): 100 INJECTION SUBCUTANEOUS at 17:38

## 2017-05-05 RX ADMIN — HYDROMORPHONE HYDROCHLORIDE 1.5 MG/HR: 2 INJECTION INTRAMUSCULAR; INTRAVENOUS; SUBCUTANEOUS at 17:56

## 2017-05-05 RX ADMIN — Medication 4 MILLIGRAM(S): at 17:38

## 2017-05-05 RX ADMIN — HYDROMORPHONE HYDROCHLORIDE 1.5 MG/HR: 2 INJECTION INTRAMUSCULAR; INTRAVENOUS; SUBCUTANEOUS at 07:50

## 2017-05-05 RX ADMIN — Medication 4 MILLIGRAM(S): at 06:22

## 2017-05-05 RX ADMIN — OCTREOTIDE ACETATE 5 MICROGRAM(S)/HR: 200 INJECTION, SOLUTION INTRAVENOUS; SUBCUTANEOUS at 07:50

## 2017-05-05 RX ADMIN — SODIUM CHLORIDE 20 MILLILITER(S): 9 INJECTION INTRAMUSCULAR; INTRAVENOUS; SUBCUTANEOUS at 07:50

## 2017-05-06 PROCEDURE — 99233 SBSQ HOSP IP/OBS HIGH 50: CPT

## 2017-05-06 RX ADMIN — ENOXAPARIN SODIUM 40 MILLIGRAM(S): 100 INJECTION SUBCUTANEOUS at 17:47

## 2017-05-06 RX ADMIN — HYDROMORPHONE HYDROCHLORIDE 1.5 MG/HR: 2 INJECTION INTRAMUSCULAR; INTRAVENOUS; SUBCUTANEOUS at 17:48

## 2017-05-06 RX ADMIN — SODIUM CHLORIDE 20 MILLILITER(S): 9 INJECTION INTRAMUSCULAR; INTRAVENOUS; SUBCUTANEOUS at 07:37

## 2017-05-06 RX ADMIN — Medication 4 MILLIGRAM(S): at 05:29

## 2017-05-06 RX ADMIN — HYDROMORPHONE HYDROCHLORIDE 3 MILLIGRAM(S): 2 INJECTION INTRAMUSCULAR; INTRAVENOUS; SUBCUTANEOUS at 17:46

## 2017-05-06 RX ADMIN — Medication 4 MILLIGRAM(S): at 17:48

## 2017-05-06 RX ADMIN — HYDROMORPHONE HYDROCHLORIDE 3 MILLIGRAM(S): 2 INJECTION INTRAMUSCULAR; INTRAVENOUS; SUBCUTANEOUS at 18:05

## 2017-05-06 RX ADMIN — SODIUM CHLORIDE 20 MILLILITER(S): 9 INJECTION INTRAMUSCULAR; INTRAVENOUS; SUBCUTANEOUS at 05:29

## 2017-05-06 RX ADMIN — OCTREOTIDE ACETATE 5 MICROGRAM(S)/HR: 200 INJECTION, SOLUTION INTRAVENOUS; SUBCUTANEOUS at 07:36

## 2017-05-06 RX ADMIN — OCTREOTIDE ACETATE 5 MICROGRAM(S)/HR: 200 INJECTION, SOLUTION INTRAVENOUS; SUBCUTANEOUS at 17:48

## 2017-05-06 RX ADMIN — HYDROMORPHONE HYDROCHLORIDE 1.5 MG/HR: 2 INJECTION INTRAMUSCULAR; INTRAVENOUS; SUBCUTANEOUS at 07:36

## 2017-05-07 PROCEDURE — 99233 SBSQ HOSP IP/OBS HIGH 50: CPT

## 2017-05-07 RX ADMIN — ENOXAPARIN SODIUM 40 MILLIGRAM(S): 100 INJECTION SUBCUTANEOUS at 17:50

## 2017-05-07 RX ADMIN — HYDROMORPHONE HYDROCHLORIDE 3 MILLIGRAM(S): 2 INJECTION INTRAMUSCULAR; INTRAVENOUS; SUBCUTANEOUS at 17:35

## 2017-05-07 RX ADMIN — HYDROMORPHONE HYDROCHLORIDE 1.5 MG/HR: 2 INJECTION INTRAMUSCULAR; INTRAVENOUS; SUBCUTANEOUS at 18:05

## 2017-05-07 RX ADMIN — Medication 4 MILLIGRAM(S): at 17:51

## 2017-05-07 RX ADMIN — SODIUM CHLORIDE 20 MILLILITER(S): 9 INJECTION INTRAMUSCULAR; INTRAVENOUS; SUBCUTANEOUS at 07:39

## 2017-05-07 RX ADMIN — SODIUM CHLORIDE 20 MILLILITER(S): 9 INJECTION INTRAMUSCULAR; INTRAVENOUS; SUBCUTANEOUS at 06:10

## 2017-05-07 RX ADMIN — PANTOPRAZOLE SODIUM 40 MILLIGRAM(S): 20 TABLET, DELAYED RELEASE ORAL at 09:14

## 2017-05-07 RX ADMIN — OCTREOTIDE ACETATE 5 MICROGRAM(S)/HR: 200 INJECTION, SOLUTION INTRAVENOUS; SUBCUTANEOUS at 13:56

## 2017-05-07 RX ADMIN — OCTREOTIDE ACETATE 5 MICROGRAM(S)/HR: 200 INJECTION, SOLUTION INTRAVENOUS; SUBCUTANEOUS at 07:39

## 2017-05-07 RX ADMIN — OCTREOTIDE ACETATE 5 MICROGRAM(S)/HR: 200 INJECTION, SOLUTION INTRAVENOUS; SUBCUTANEOUS at 19:13

## 2017-05-07 RX ADMIN — HYDROMORPHONE HYDROCHLORIDE 1.5 MG/HR: 2 INJECTION INTRAMUSCULAR; INTRAVENOUS; SUBCUTANEOUS at 07:39

## 2017-05-07 RX ADMIN — HYDROMORPHONE HYDROCHLORIDE 3 MILLIGRAM(S): 2 INJECTION INTRAMUSCULAR; INTRAVENOUS; SUBCUTANEOUS at 17:26

## 2017-05-07 RX ADMIN — Medication 4 MILLIGRAM(S): at 06:10

## 2017-05-07 RX ADMIN — HYDROMORPHONE HYDROCHLORIDE 1.5 MG/HR: 2 INJECTION INTRAMUSCULAR; INTRAVENOUS; SUBCUTANEOUS at 19:13

## 2017-05-08 VITALS
OXYGEN SATURATION: 92 % | TEMPERATURE: 98 F | SYSTOLIC BLOOD PRESSURE: 116 MMHG | DIASTOLIC BLOOD PRESSURE: 77 MMHG | HEART RATE: 98 BPM

## 2017-05-08 PROCEDURE — 83735 ASSAY OF MAGNESIUM: CPT

## 2017-05-08 PROCEDURE — 74176 CT ABD & PELVIS W/O CONTRAST: CPT

## 2017-05-08 PROCEDURE — 85027 COMPLETE CBC AUTOMATED: CPT

## 2017-05-08 PROCEDURE — 74020: CPT

## 2017-05-08 PROCEDURE — 96374 THER/PROPH/DIAG INJ IV PUSH: CPT

## 2017-05-08 PROCEDURE — 99238 HOSP IP/OBS DSCHRG MGMT 30/<: CPT

## 2017-05-08 PROCEDURE — 81001 URINALYSIS AUTO W/SCOPE: CPT

## 2017-05-08 PROCEDURE — 99285 EMERGENCY DEPT VISIT HI MDM: CPT | Mod: 25

## 2017-05-08 PROCEDURE — 84100 ASSAY OF PHOSPHORUS: CPT

## 2017-05-08 PROCEDURE — 93971 EXTREMITY STUDY: CPT

## 2017-05-08 PROCEDURE — 80053 COMPREHEN METABOLIC PANEL: CPT

## 2017-05-08 PROCEDURE — 71045 X-RAY EXAM CHEST 1 VIEW: CPT

## 2017-05-08 PROCEDURE — 93005 ELECTROCARDIOGRAM TRACING: CPT

## 2017-05-08 RX ORDER — DEXAMETHASONE 0.5 MG/5ML
4 ELIXIR ORAL
Qty: 112 | Refills: 0 | OUTPATIENT
Start: 2017-05-08 | End: 2017-05-22

## 2017-05-08 RX ORDER — PANTOPRAZOLE SODIUM 20 MG/1
1 TABLET, DELAYED RELEASE ORAL
Qty: 14 | Refills: 0 | OUTPATIENT
Start: 2017-05-08 | End: 2017-05-22

## 2017-05-08 RX ORDER — ATROPINE SULFATE 1 %
2 DROPS OPHTHALMIC (EYE)
Qty: 1 | Refills: 0 | OUTPATIENT
Start: 2017-05-08 | End: 2017-05-22

## 2017-05-08 RX ORDER — ONDANSETRON 8 MG/1
1 TABLET, FILM COATED ORAL
Qty: 60 | Refills: 0 | OUTPATIENT
Start: 2017-05-08 | End: 2017-05-28

## 2017-05-08 RX ADMIN — OCTREOTIDE ACETATE 10 MILLIGRAM(S): 200 INJECTION, SOLUTION INTRAVENOUS; SUBCUTANEOUS at 12:47

## 2017-05-08 RX ADMIN — Medication 4 MILLIGRAM(S): at 05:38

## 2017-05-08 RX ADMIN — PANTOPRAZOLE SODIUM 40 MILLIGRAM(S): 20 TABLET, DELAYED RELEASE ORAL at 09:30

## 2017-05-25 PROCEDURE — 71045 X-RAY EXAM CHEST 1 VIEW: CPT

## 2017-05-25 PROCEDURE — 87040 BLOOD CULTURE FOR BACTERIA: CPT

## 2017-05-25 PROCEDURE — 83735 ASSAY OF MAGNESIUM: CPT

## 2017-05-25 PROCEDURE — 99284 EMERGENCY DEPT VISIT MOD MDM: CPT | Mod: 25

## 2017-05-25 PROCEDURE — 85027 COMPLETE CBC AUTOMATED: CPT

## 2017-05-25 PROCEDURE — 84100 ASSAY OF PHOSPHORUS: CPT

## 2017-05-25 PROCEDURE — 85730 THROMBOPLASTIN TIME PARTIAL: CPT

## 2017-05-25 PROCEDURE — 82435 ASSAY OF BLOOD CHLORIDE: CPT

## 2017-05-25 PROCEDURE — 74177 CT ABD & PELVIS W/CONTRAST: CPT

## 2017-05-25 PROCEDURE — 80053 COMPREHEN METABOLIC PANEL: CPT

## 2017-05-25 PROCEDURE — 84132 ASSAY OF SERUM POTASSIUM: CPT

## 2017-05-25 PROCEDURE — 85610 PROTHROMBIN TIME: CPT

## 2017-05-25 PROCEDURE — 82803 BLOOD GASES ANY COMBINATION: CPT

## 2017-05-25 PROCEDURE — 85014 HEMATOCRIT: CPT

## 2017-05-25 PROCEDURE — 84295 ASSAY OF SERUM SODIUM: CPT

## 2017-05-25 PROCEDURE — 82330 ASSAY OF CALCIUM: CPT

## 2017-05-25 PROCEDURE — 96374 THER/PROPH/DIAG INJ IV PUSH: CPT | Mod: XU

## 2017-05-25 PROCEDURE — 83605 ASSAY OF LACTIC ACID: CPT

## 2017-05-25 PROCEDURE — 82947 ASSAY GLUCOSE BLOOD QUANT: CPT

## 2017-05-25 PROCEDURE — 94640 AIRWAY INHALATION TREATMENT: CPT

## 2018-01-16 NOTE — ED PROVIDER NOTE - PMH
Colon cancer  metastatic to abdominal lymph nodes, uterus, and possibly lungs  Urinary obstruction  by abdominal mass s/p b/l nephrostomy tubes.
24.3

## 2018-07-28 PROBLEM — Z78.9 ALCOHOL USE: Status: INACTIVE | Noted: 2017-01-19

## 2020-01-19 NOTE — ED ADULT NURSE REASSESSMENT NOTE - NS ED NURSE REASSESS COMMENT FT1
Discharge Planner PT   Patient plan for discharge: Pt hopeful to go home but if she has insurance coverage, she is willing to go to rehab.  Current status: Pt was received supine in bed, agreed to PT interventions. Pt with soft voice. Pt required mod assist x 1 and increased time to complete supine>sit with HOB elevated and using bed rail. Pt noted with freezing episodes during functional mobility. Pt sat at EOB x 12 min with SBA, no LOB. BP stable in sitting. Pt stood x 2 with min assist x 2 and max cues for hand placement, upright posture, leg placement and technique. Pt stood initially x 2 min with max encouragement and min assist x 2 due to feeling dizzy. Noted to be orthostatic in standing. See vitals for details. Pt stood a second time x 1 min with RW for support and min assist x 1 for safety with verbal/tactile cues for upright posture. Pt attempted scooting backwards and sideways while sitting at EOB, however unable, therefore was assisted back to bed with mod assist x 2. Pt was able to minimally scoot hip to the middle of the bed to center herself with min assist x1 to assist with LE's and verbal cues. Pt was scooted to the HOB with 2 person max assist.   Barriers to return to prior living situation: Needs assist with all mobility, has had little to no ability to ambulate while here, high falls risk, is home alone when spouse is at work.  Recommendations for discharge: TCU  Rationale for recommendations: Pt agreeable to TCU if she has insurance coverage. If private pay, she prefers to return home with her spouse taking some time off to care for her with home PT. Pt would benefit from continued PT to improve strength, balance, mobility to increase independence, reduce falls risk and increase safety before returning home.       Entered by: Clifford Tomas 01/19/2020 12:05 PM        pt returned from IR, pending dispo. daughter at bedside verbalizes that pt is unable to walk up the steps at home d/t the weather conditions, pt now pending ambulance for transport. non-emergent ambulance for transport form completed. VS stable, daughter at bedside. safety precautions maintained.

## 2021-05-11 NOTE — ED PROVIDER NOTE - CARE PLAN
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  
Principal Discharge DX:	SBO (small bowel obstruction)

## 2021-11-04 NOTE — PATIENT PROFILE ADULT. - FUNCTIONAL LEVEL PRIOR: TOILETING
On medications,  no chest pain, stable, monitored and followed up by primary team/cardiology team (0) independent

## 2023-12-20 NOTE — ED PROVIDER NOTE - CONDITION AT DISCHARGE:
OUTPATIENT PROGRESS NOTE      CHIEF COMPLAINT:  Chief Complaint   Patient presents with    Office Visit    Follow-up       HPI:  The patient presented to the office for ***    Ocular medications:  ***     Cynthia  gave us verbal permission to discuss their medical condition in the presence of the following individual(s) in the room: n/a ***     VISUAL ACUITY:  Corrected/uncorrected:     SOCIAL HISTORY:  Social History     Socioeconomic History    Marital status:      Spouse name: Not on file    Number of children: Not on file    Years of education: Not on file    Highest education level: Not on file   Occupational History    Occupation: retired farmer   Tobacco Use    Smoking status: Former     Current packs/day: 0.00     Types: Cigarettes     Quit date: 1957     Years since quittin.0    Smokeless tobacco: Never   Vaping Use    Vaping Use: never used   Substance and Sexual Activity    Alcohol use: Not Currently     Alcohol/week: 0.0 standard drinks of alcohol     Comment: 1/month     Drug use: No    Sexual activity: Not Currently   Other Topics Concern    Not on file   Social History Narrative    Not on file     Social Determinants of Health     Financial Resource Strain: Not on file   Food Insecurity: Not on file   Transportation Needs: Not on file   Physical Activity: Not on file   Stress: Not on file   Social Connections: Not on file   Interpersonal Safety: Not At Risk (2023)    Interpersonal Safety     Social Determinants: Intimate Partner Violence Past Fear: No     Social Determinants: Intimate Partner Violence Current Fear: No     ***    I reviewed testing done by technician found in [employer].    OPHTHALMIC EXAMINATION:  Not recorded           ASSESSMENT:   No diagnosis found.    PLAN:  No orders of the defined types were placed in this encounter.      FOLLOW UP:  No follow-ups on file.             Improved

## 2025-02-27 NOTE — ED PROVIDER NOTE - DATE/TIME 1
Vital Signs Last 24 Hrs  T(C): 36.6 (27 Feb 2025 02:13), Max: 36.6 (26 Feb 2025 12:04)  T(F): 97.9 (27 Feb 2025 02:13), Max: 97.9 (26 Feb 2025 12:04)  HR: 89 (27 Feb 2025 02:13) (75 - 89)  BP: 137/72 (27 Feb 2025 02:13) (130/82 - 151/75)  BP(mean): 100 (26 Feb 2025 12:04) (100 - 100)  RR: 19 (27 Feb 2025 02:13) (18 - 19)  SpO2: 99% (27 Feb 2025 02:13) (97% - 99%)    Parameters below as of 27 Feb 2025 02:13  Patient On (Oxygen Delivery Method): room air        PHYSICAL EXAM:  GENERAL: NAD, well-appearing  CHEST/LUNG: Clear to auscultation bilaterally  HEART: Regular rate and rhythm  ABDOMEN: Soft, tenderness to palpation in the RUQ, no rebound tenderness or guarding.  EXTREMITIES:  No clubbing, cyanosis, or edema
29-Mar-2017 21:29